# Patient Record
Sex: FEMALE | Race: WHITE | NOT HISPANIC OR LATINO | URBAN - METROPOLITAN AREA
[De-identification: names, ages, dates, MRNs, and addresses within clinical notes are randomized per-mention and may not be internally consistent; named-entity substitution may affect disease eponyms.]

---

## 2022-07-05 ENCOUNTER — INPATIENT (INPATIENT)
Facility: HOSPITAL | Age: 77
LOS: 0 days | Discharge: HOME | DRG: 305 | End: 2022-07-06
Attending: INTERNAL MEDICINE | Admitting: INTERNAL MEDICINE
Payer: COMMERCIAL

## 2022-07-05 VITALS
RESPIRATION RATE: 16 BRPM | SYSTOLIC BLOOD PRESSURE: 149 MMHG | TEMPERATURE: 97 F | HEART RATE: 84 BPM | DIASTOLIC BLOOD PRESSURE: 80 MMHG | OXYGEN SATURATION: 95 % | WEIGHT: 177.03 LBS

## 2022-07-05 DIAGNOSIS — R42 DIZZINESS AND GIDDINESS: ICD-10-CM

## 2022-07-05 DIAGNOSIS — R77.8 OTHER SPECIFIED ABNORMALITIES OF PLASMA PROTEINS: ICD-10-CM

## 2022-07-05 DIAGNOSIS — R55 SYNCOPE AND COLLAPSE: ICD-10-CM

## 2022-07-05 DIAGNOSIS — I10 ESSENTIAL (PRIMARY) HYPERTENSION: ICD-10-CM

## 2022-07-05 DIAGNOSIS — E11.9 TYPE 2 DIABETES MELLITUS WITHOUT COMPLICATIONS: ICD-10-CM

## 2022-07-05 LAB
ALBUMIN SERPL ELPH-MCNC: 3.4 G/DL — SIGNIFICANT CHANGE UP (ref 3.3–5)
ALP SERPL-CCNC: 94 U/L — SIGNIFICANT CHANGE UP (ref 40–120)
ALT FLD-CCNC: 16 U/L — SIGNIFICANT CHANGE UP (ref 12–78)
ANION GAP SERPL CALC-SCNC: 4 MMOL/L — LOW (ref 5–17)
APTT BLD: 32.5 SEC — SIGNIFICANT CHANGE UP (ref 27.5–35.5)
AST SERPL-CCNC: 21 U/L — SIGNIFICANT CHANGE UP (ref 15–37)
BASOPHILS # BLD AUTO: 0.06 K/UL — SIGNIFICANT CHANGE UP (ref 0–0.2)
BASOPHILS NFR BLD AUTO: 0.6 % — SIGNIFICANT CHANGE UP (ref 0–2)
BILIRUB SERPL-MCNC: 0.4 MG/DL — SIGNIFICANT CHANGE UP (ref 0.2–1.2)
BUN SERPL-MCNC: 14 MG/DL — SIGNIFICANT CHANGE UP (ref 7–23)
CALCIUM SERPL-MCNC: 9 MG/DL — SIGNIFICANT CHANGE UP (ref 8.5–10.1)
CHLORIDE SERPL-SCNC: 104 MMOL/L — SIGNIFICANT CHANGE UP (ref 96–108)
CK SERPL-CCNC: 79 U/L — SIGNIFICANT CHANGE UP (ref 26–192)
CO2 SERPL-SCNC: 28 MMOL/L — SIGNIFICANT CHANGE UP (ref 22–31)
CREAT SERPL-MCNC: 0.86 MG/DL — SIGNIFICANT CHANGE UP (ref 0.5–1.3)
EGFR: 70 ML/MIN/1.73M2 — SIGNIFICANT CHANGE UP
EOSINOPHIL # BLD AUTO: 0.1 K/UL — SIGNIFICANT CHANGE UP (ref 0–0.5)
EOSINOPHIL NFR BLD AUTO: 1 % — SIGNIFICANT CHANGE UP (ref 0–6)
GLUCOSE SERPL-MCNC: 116 MG/DL — HIGH (ref 70–99)
HCT VFR BLD CALC: 43.1 % — SIGNIFICANT CHANGE UP (ref 34.5–45)
HGB BLD-MCNC: 13.2 G/DL — SIGNIFICANT CHANGE UP (ref 11.5–15.5)
IMM GRANULOCYTES NFR BLD AUTO: 0.7 % — SIGNIFICANT CHANGE UP (ref 0–1.5)
INR BLD: 1.01 RATIO — SIGNIFICANT CHANGE UP (ref 0.88–1.16)
LIDOCAIN IGE QN: 128 U/L — SIGNIFICANT CHANGE UP (ref 73–393)
LYMPHOCYTES # BLD AUTO: 2.32 K/UL — SIGNIFICANT CHANGE UP (ref 1–3.3)
LYMPHOCYTES # BLD AUTO: 23.7 % — SIGNIFICANT CHANGE UP (ref 13–44)
MCHC RBC-ENTMCNC: 26.5 PG — LOW (ref 27–34)
MCHC RBC-ENTMCNC: 30.6 GM/DL — LOW (ref 32–36)
MCV RBC AUTO: 86.4 FL — SIGNIFICANT CHANGE UP (ref 80–100)
MONOCYTES # BLD AUTO: 0.72 K/UL — SIGNIFICANT CHANGE UP (ref 0–0.9)
MONOCYTES NFR BLD AUTO: 7.4 % — SIGNIFICANT CHANGE UP (ref 2–14)
NEUTROPHILS # BLD AUTO: 6.51 K/UL — SIGNIFICANT CHANGE UP (ref 1.8–7.4)
NEUTROPHILS NFR BLD AUTO: 66.6 % — SIGNIFICANT CHANGE UP (ref 43–77)
NRBC # BLD: 0 /100 WBCS — SIGNIFICANT CHANGE UP (ref 0–0)
PLATELET # BLD AUTO: 323 K/UL — SIGNIFICANT CHANGE UP (ref 150–400)
POTASSIUM SERPL-MCNC: 4.4 MMOL/L — SIGNIFICANT CHANGE UP (ref 3.5–5.3)
POTASSIUM SERPL-SCNC: 4.4 MMOL/L — SIGNIFICANT CHANGE UP (ref 3.5–5.3)
PROT SERPL-MCNC: 7.6 G/DL — SIGNIFICANT CHANGE UP (ref 6–8.3)
PROTHROM AB SERPL-ACNC: 11.8 SEC — SIGNIFICANT CHANGE UP (ref 10.5–13.4)
RBC # BLD: 4.99 M/UL — SIGNIFICANT CHANGE UP (ref 3.8–5.2)
RBC # FLD: 16 % — HIGH (ref 10.3–14.5)
SARS-COV-2 RNA SPEC QL NAA+PROBE: SIGNIFICANT CHANGE UP
SODIUM SERPL-SCNC: 136 MMOL/L — SIGNIFICANT CHANGE UP (ref 135–145)
TROPONIN I, HIGH SENSITIVITY RESULT: 316.7 NG/L — HIGH
TROPONIN I, HIGH SENSITIVITY RESULT: 935.7 NG/L — HIGH
WBC # BLD: 9.78 K/UL — SIGNIFICANT CHANGE UP (ref 3.8–10.5)
WBC # FLD AUTO: 9.78 K/UL — SIGNIFICANT CHANGE UP (ref 3.8–10.5)

## 2022-07-05 PROCEDURE — 70498 CT ANGIOGRAPHY NECK: CPT | Mod: 26,MA

## 2022-07-05 PROCEDURE — 99285 EMERGENCY DEPT VISIT HI MDM: CPT | Mod: FS

## 2022-07-05 PROCEDURE — 99223 1ST HOSP IP/OBS HIGH 75: CPT

## 2022-07-05 PROCEDURE — 71045 X-RAY EXAM CHEST 1 VIEW: CPT | Mod: 26

## 2022-07-05 PROCEDURE — 70496 CT ANGIOGRAPHY HEAD: CPT | Mod: 26,MA

## 2022-07-05 RX ORDER — DEXTROSE 50 % IN WATER 50 %
12.5 SYRINGE (ML) INTRAVENOUS ONCE
Refills: 0 | Status: DISCONTINUED | OUTPATIENT
Start: 2022-07-05 | End: 2022-07-06

## 2022-07-05 RX ORDER — MECLIZINE HCL 12.5 MG
25 TABLET ORAL EVERY 6 HOURS
Refills: 0 | Status: DISCONTINUED | OUTPATIENT
Start: 2022-07-05 | End: 2022-07-06

## 2022-07-05 RX ORDER — PANTOPRAZOLE SODIUM 20 MG/1
40 TABLET, DELAYED RELEASE ORAL
Refills: 0 | Status: DISCONTINUED | OUTPATIENT
Start: 2022-07-05 | End: 2022-07-06

## 2022-07-05 RX ORDER — INSULIN LISPRO 100/ML
VIAL (ML) SUBCUTANEOUS
Refills: 0 | Status: DISCONTINUED | OUTPATIENT
Start: 2022-07-05 | End: 2022-07-06

## 2022-07-05 RX ORDER — INSULIN LISPRO 100/ML
VIAL (ML) SUBCUTANEOUS AT BEDTIME
Refills: 0 | Status: DISCONTINUED | OUTPATIENT
Start: 2022-07-05 | End: 2022-07-06

## 2022-07-05 RX ORDER — DEXTROSE 50 % IN WATER 50 %
15 SYRINGE (ML) INTRAVENOUS ONCE
Refills: 0 | Status: DISCONTINUED | OUTPATIENT
Start: 2022-07-05 | End: 2022-07-06

## 2022-07-05 RX ORDER — FLUOXETINE HCL 10 MG
40 CAPSULE ORAL DAILY
Refills: 0 | Status: DISCONTINUED | OUTPATIENT
Start: 2022-07-05 | End: 2022-07-06

## 2022-07-05 RX ORDER — ALBUTEROL 90 UG/1
2 AEROSOL, METERED ORAL EVERY 6 HOURS
Refills: 0 | Status: DISCONTINUED | OUTPATIENT
Start: 2022-07-05 | End: 2022-07-06

## 2022-07-05 RX ORDER — ENOXAPARIN SODIUM 100 MG/ML
40 INJECTION SUBCUTANEOUS EVERY 24 HOURS
Refills: 0 | Status: DISCONTINUED | OUTPATIENT
Start: 2022-07-05 | End: 2022-07-06

## 2022-07-05 RX ORDER — MECLIZINE HCL 12.5 MG
25 TABLET ORAL ONCE
Refills: 0 | Status: COMPLETED | OUTPATIENT
Start: 2022-07-05 | End: 2022-07-05

## 2022-07-05 RX ORDER — ASPIRIN/CALCIUM CARB/MAGNESIUM 324 MG
81 TABLET ORAL DAILY
Refills: 0 | Status: DISCONTINUED | OUTPATIENT
Start: 2022-07-05 | End: 2022-07-06

## 2022-07-05 RX ORDER — FLUTICASONE PROPIONATE 50 MCG
1 SPRAY, SUSPENSION NASAL
Refills: 0 | Status: DISCONTINUED | OUTPATIENT
Start: 2022-07-05 | End: 2022-07-06

## 2022-07-05 RX ORDER — GLUCAGON INJECTION, SOLUTION 0.5 MG/.1ML
1 INJECTION, SOLUTION SUBCUTANEOUS ONCE
Refills: 0 | Status: DISCONTINUED | OUTPATIENT
Start: 2022-07-05 | End: 2022-07-06

## 2022-07-05 RX ORDER — ARIPIPRAZOLE 15 MG/1
2 TABLET ORAL DAILY
Refills: 0 | Status: DISCONTINUED | OUTPATIENT
Start: 2022-07-05 | End: 2022-07-06

## 2022-07-05 RX ORDER — GABAPENTIN 400 MG/1
300 CAPSULE ORAL THREE TIMES A DAY
Refills: 0 | Status: DISCONTINUED | OUTPATIENT
Start: 2022-07-05 | End: 2022-07-06

## 2022-07-05 RX ORDER — NICOTINE POLACRILEX 2 MG
1 GUM BUCCAL DAILY
Refills: 0 | Status: DISCONTINUED | OUTPATIENT
Start: 2022-07-05 | End: 2022-07-06

## 2022-07-05 RX ORDER — SODIUM CHLORIDE 9 MG/ML
1000 INJECTION, SOLUTION INTRAVENOUS
Refills: 0 | Status: DISCONTINUED | OUTPATIENT
Start: 2022-07-05 | End: 2022-07-06

## 2022-07-05 RX ORDER — ONDANSETRON 8 MG/1
4 TABLET, FILM COATED ORAL ONCE
Refills: 0 | Status: COMPLETED | OUTPATIENT
Start: 2022-07-05 | End: 2022-07-05

## 2022-07-05 RX ORDER — LOSARTAN POTASSIUM 100 MG/1
25 TABLET, FILM COATED ORAL DAILY
Refills: 0 | Status: DISCONTINUED | OUTPATIENT
Start: 2022-07-05 | End: 2022-07-06

## 2022-07-05 RX ORDER — ACETAMINOPHEN 500 MG
650 TABLET ORAL EVERY 6 HOURS
Refills: 0 | Status: DISCONTINUED | OUTPATIENT
Start: 2022-07-05 | End: 2022-07-06

## 2022-07-05 RX ORDER — LANOLIN ALCOHOL/MO/W.PET/CERES
3 CREAM (GRAM) TOPICAL AT BEDTIME
Refills: 0 | Status: DISCONTINUED | OUTPATIENT
Start: 2022-07-05 | End: 2022-07-06

## 2022-07-05 RX ORDER — DEXTROSE 50 % IN WATER 50 %
25 SYRINGE (ML) INTRAVENOUS ONCE
Refills: 0 | Status: DISCONTINUED | OUTPATIENT
Start: 2022-07-05 | End: 2022-07-06

## 2022-07-05 RX ORDER — FUROSEMIDE 40 MG
20 TABLET ORAL DAILY
Refills: 0 | Status: DISCONTINUED | OUTPATIENT
Start: 2022-07-05 | End: 2022-07-06

## 2022-07-05 RX ORDER — SODIUM CHLORIDE 9 MG/ML
500 INJECTION INTRAMUSCULAR; INTRAVENOUS; SUBCUTANEOUS ONCE
Refills: 0 | Status: COMPLETED | OUTPATIENT
Start: 2022-07-05 | End: 2022-07-05

## 2022-07-05 RX ORDER — ONDANSETRON 8 MG/1
4 TABLET, FILM COATED ORAL EVERY 6 HOURS
Refills: 0 | Status: DISCONTINUED | OUTPATIENT
Start: 2022-07-05 | End: 2022-07-06

## 2022-07-05 RX ADMIN — ENOXAPARIN SODIUM 40 MILLIGRAM(S): 100 INJECTION SUBCUTANEOUS at 22:42

## 2022-07-05 RX ADMIN — Medication 25 MILLIGRAM(S): at 11:29

## 2022-07-05 RX ADMIN — ONDANSETRON 4 MILLIGRAM(S): 8 TABLET, FILM COATED ORAL at 11:33

## 2022-07-05 RX ADMIN — Medication 1 PATCH: at 22:41

## 2022-07-05 RX ADMIN — GABAPENTIN 300 MILLIGRAM(S): 400 CAPSULE ORAL at 22:43

## 2022-07-05 RX ADMIN — SODIUM CHLORIDE 500 MILLILITER(S): 9 INJECTION INTRAMUSCULAR; INTRAVENOUS; SUBCUTANEOUS at 12:33

## 2022-07-05 RX ADMIN — SODIUM CHLORIDE 500 MILLILITER(S): 9 INJECTION INTRAMUSCULAR; INTRAVENOUS; SUBCUTANEOUS at 11:33

## 2022-07-05 NOTE — H&P ADULT - PROBLEM SELECTOR PLAN 1
Admit to tele  Check ECHO  Serial enzymes  Neuro check q4h  Neuro eval  PT  Further work-up/management pending clinical course.

## 2022-07-05 NOTE — H&P ADULT - HISTORY OF PRESENT ILLNESS
77 yo F PMHx HTN, DM, asthma presents to ED c/o dizziness/light-headedness, N/V x just prior to arrival. (LKW 9:30 as per pts roommate.) Pt states symptoms began while at PCP office. Routine follow up appointment, was discussing chronic constipation, PCP had pt lie down to examine her abdomen when she reports suddenly becoming dizzy, room spinning, vomiting and as per pt systolic BP at the time was 200. PCP called EMS. Pt states symptoms are persistent, present at rest but worse with head movement and positional changes. Denies HA, chest pain, SOB, abd pain (contrary to triage note), cough, fever/chills, recent travel, known sick contacts, urinary symptoms, numbness/tingling, weakness. Feels well now.

## 2022-07-05 NOTE — ED PROVIDER NOTE - CARE PLAN
1 Principal Discharge DX:	Dizziness  Secondary Diagnosis:	Elevated troponin  Secondary Diagnosis:	Near syncope

## 2022-07-05 NOTE — CONSULT NOTE ADULT - NS ATTEND AMEND GEN_ALL_CORE FT
Chart reviewed    Patient seen and examined    Agree with plan as outlined above    77 yo F PMH HTN, DM, PVD, back pain, presents to ED c/o dizziness/light-headedness, N/V found to have elevated Troponin. Cardiology called for dizziness and to r/o ACS    - Patient was at PCP office for routine follow up appointment, was discussing chronic constipation, PCP had pt lie down to examine her abdomen when she reports suddenly becoming dizzy, room spinning, vomiting and as per pt systolic BP at the time was 200.   - Patient was getting CT abdomen done, experienced recurrence in symptoms. Symptoms are persistent, present at rest but worse with head movement and positional changes.   - S/p Meclizine and Zofran x 1   - CK 99, CK-MB units 1.4, Troponin HsI 316.7. Follow up second set.  - Denies any chest pain, palpitation, orthopnea, PND, dizziness, lightheadedness.   - CK is normal, suggesting against acute atherosclerotic plaque rupture.  - EKG showed SR @ 81, poor R wave progression anteriorly. No old EKG to compare   - No clear evidence of acute ischemia.  - Elevated troponin likely related to hypertensive episode. She does have risk factors for CAD.   - BP stable and controlled in ER. Continue losartan   - Reports chronic SOB. + daily 1 pack smoker since 20 years.   - + LE edema chronic due to PVD.   - Continue home Lasix   - Given multiple risk factors for CAD, will need outpatient stress test.   - Monitor and replete lytes, keep K>4, Mg>2.

## 2022-07-05 NOTE — PATIENT PROFILE ADULT - FALL HARM RISK - HARM RISK INTERVENTIONS
Assistance with ambulation/Assistance OOB with selected safe patient handling equipment/Communicate Risk of Fall with Harm to all staff/Discuss with provider need for PT consult/Monitor gait and stability/Provide patient with walking aids - walker, cane, crutches/Reinforce activity limits and safety measures with patient and family/Sit up slowly, dangle for a short time, stand at bedside before walking/Tailored Fall Risk Interventions/Visual Cue: Yellow wristband and red socks/Bed in lowest position, wheels locked, appropriate side rails in place/Call bell, personal items and telephone in reach/Instruct patient to call for assistance before getting out of bed or chair/Non-slip footwear when patient is out of bed/[place] to call system/Physically safe environment - no spills, clutter or unnecessary equipment/Purposeful Proactive Rounding/Room/bathroom lighting operational, light cord in reach

## 2022-07-05 NOTE — H&P ADULT - PROBLEM SELECTOR PLAN 2
Possibly 2/2 elevated BP  Trend enzymes  ECHO  ASA 81mg qd  Check lipid profile  Cardio consult  Further work-up/management pending clinical course.

## 2022-07-05 NOTE — CONSULT NOTE ADULT - ASSESSMENT
75 yo F PMH HTN, DM, PVD, back pain, presents to ED c/o dizziness/light-headedness, N/V found to have elevated Troponin. Cardiology called for dizziness and to r/o ACS    - Patient was at PCP office for routine follow up appointment, was discussing chronic constipation, PCP had pt lie down to examine her abdomen when she reports suddenly becoming dizzy, room spinning, vomiting and as per pt systolic BP at the time was 200.   - Patient was getting CT abdomen done, experienced recurrence in symptoms. Symptoms are persistent, present at rest but worse with head movement and positional changes.   - S/p Meclizine and Zofran x 1   - CK 99, CK-MB units 1.4, Troponin HsI 316.7. Follow up second set.  - Denies any chest pain, palpitation, orthopnea, PND, dizziness, lightheadedness.   - CK is normal, suggesting against acute atherosclerotic plaque rupture.  - EKG showed SR @ 81, poor R wave progression anteriorly. No old EKG to compare   - No clear evidence of acute ischemia.  - Elevated troponin likely related to hypertensive episode. She does have risk factors for CAD.   - BP stable and controlled in ER. Continue losartan   - Reports chronic SOB. + daily 1 pack smoker since 20 years.   - + LE edema chronic due to PVD.   - Continue home Lasix   - Given multiple risk factors for CAD, will need outpatient stress test.   - Monitor and replete lytes, keep K>4, Mg>2.    Jackeline Clements, MS FNP, [facility]  Nurse Practitioner- Cardiology   Spectra #[phone] /[phone] 77 yo F PMH HTN, DM, PVD, back pain, presents to ED c/o dizziness/light-headedness, N/V found to have elevated Troponin. Cardiology called for dizziness and to r/o ACS    - Patient was at PCP office for routine follow up appointment, was discussing chronic constipation, PCP had pt lie down to examine her abdomen when she reports suddenly becoming dizzy, room spinning, vomiting and as per pt systolic BP at the time was 200.   - Patient was getting CT abdomen done, experienced recurrence in symptoms. Symptoms are persistent, present at rest but worse with head movement and positional changes.   - S/p Meclizine and Zofran x 1   - CK 99, CK-MB units 1.4, Troponin HsI 316.7. Follow up second set.  - Denies any chest pain, palpitation, orthopnea, PND, dizziness, lightheadedness.   - CK is normal, suggesting against acute atherosclerotic plaque rupture.  - EKG showed SR @ 81, poor R wave progression anteriorly. No old EKG to compare   - No clear evidence of acute ischemia.  - Elevated troponin likely related to hypertensive episode. She does have risk factors for CAD.   - Please obtain transthoracic echocardiogram to assess ventricular function, wall motion and valvular abnormalities.   - BP stable and controlled in ER. Continue losartan   - Reports chronic SOB. + daily 1 pack smoker since 20 years.   - + LE edema chronic due to PVD.   - Continue home Lasix   - Given multiple risk factors for CAD, will need outpatient stress test.   - Monitor and replete lytes, keep K>4, Mg>2.    Jackeline Clements, MS FNP, [facility]  Nurse Practitioner- Cardiology   Spectra #[phone] /[phone]

## 2022-07-05 NOTE — ED ADULT NURSE NOTE - NSIMPLEMENTINTERV_GEN_ALL_ED
Implemented All Fall Risk Interventions:  [place] to call system. Call bell, personal items and telephone within reach. Instruct patient to call for assistance. Room bathroom lighting operational. Non-slip footwear when patient is off stretcher. Physically safe environment: no spills, clutter or unnecessary equipment. Stretcher in lowest position, wheels locked, appropriate side rails in place. Provide visual cue, wrist band, yellow gown, etc. Monitor gait and stability. Monitor for mental status changes and reorient to person, place, and time. Review medications for side effects contributing to fall risk. Reinforce activity limits and safety measures with patient and family.

## 2022-07-05 NOTE — ED PROVIDER NOTE - OBJECTIVE STATEMENT
77 yo F PMHx HTN, DM presents to ED c/o dizziness/light-headedness, N/V x just prior to arrival. (LKW 9:30 as per pts roommate.) Pt states symptoms began while at PCP office. Routine follow up appointment, was discussing chronic constipation, PCP had pt lie down to examine her abdomen when she reports suddenly becoming dizzy, room spinning, vomiting and as per pt systolic BP at the time was 200. PCP called EMS. Pt states symptoms are persistent, present at rest but worse with head movement and positional changes. Denies HA, chest pain, SOB, abd pain (contrary to triage note), cough, fever/chills, recent travel, known sick contacts, urinary symptoms, numbness/tingling, weakness.

## 2022-07-05 NOTE — CONSULT NOTE ADULT - SUBJECTIVE AND OBJECTIVE BOX
[facility] - Libia Lara, Jose Batista, Meryl, Prasad, Jeremy Lott  Office Number: [phone]    Initial Consult Note  CHIEF COMPLAINT: Patient is a 76y old  Female who presents with a chief complaint of dizziness, nausea  HPI:  77 yo F PMH HTN, DM, PVD, back pain, presents to ED c/o dizziness/light-headedness, N/V x just prior to arrival. Pt states symptoms began while at PCP office for routine follow up appointment, was discussing chronic constipation, PCP had pt lie down to examine her abdomen when she reports suddenly becoming dizzy, room spinning, vomiting and as per pt systolic BP at the time was 200. PCP called EMS. Pt states symptoms are persistent, present at rest but worse with head movement and positional changes. Denies HA, abd pain, cough, fever/chills, recent travel, known sick contacts, urinary symptoms, numbness/tingling, weakness.      In ER, T(F): 98.2, HR: 89, BP: 124/56, RR: 18, SpO2: 95%. Labs remarkable for CK 99, CKMB units 1.4, Troponin HsI 316.7. Patient denies any chest pain, palpitation, orthopnea, PND, dizziness, lightheadedness. Reports chronic SOB. + daily 1 pack smoker since 20 years. + LE edema chronic due to PVD.     Allergies  No Known Allergies    PAST MEDICAL & SURGICAL HISTORY:  Hypertension    Diabetes mellitus    MEDICATIONS  (STANDING):    MEDICATIONS  (PRN):    FAMILY HISTORY:  Mother: Heart disease  Sister: Metastatic cancer  No family history of acute MI or sudden cardiac death.    SOCIAL HISTORY: No active ethanol, or drug abuse.  + daily 1 PPD smoker since age 19    REVIEW OF SYSTEMS   All other review of systems is negative unless indicated above.    VITAL SIGNS:   Vital Signs Last 24 Hrs  T(C): 36.8 (05 Jul 2022 13:56), Max: 36.8 (05 Jul 2022 13:56)  T(F): 98.2 (05 Jul 2022 13:56), Max: 98.2 (05 Jul 2022 13:56)  HR: 89 (05 Jul 2022 13:56) (84 - 89)  BP: 124/56 (05 Jul 2022 13:56) (124/56 - 149/80)  BP(mean): --  RR: 18 (05 Jul 2022 13:56) (16 - 18)  SpO2: 95% (05 Jul 2022 13:56) (95% - 95%)    Physical Exam:  Constitutional: NAD, awake and alert, Obese   HEENT: Moist Mucous Membranes, Anicteric  Pulmonary: Non-labored, breath sounds are clear bilaterally, Diminished at bases, No wheezing, rales or rhonchi  Cardiovascular: Regular, S1 and S2, No murmurs, No rubs, gallops or clicks  Gastrointestinal: Bowel Sounds present, soft, nontender.   Lymph: +1 peripheral edema. No lymphadenopathy.  Skin: No visible rashes or ulcers.  Psych:  Mood & affect appropriate    I&O's Summary      LABS: All Labs Reviewed:                        13.2   9.78  )-----------( 323      ( 05 Jul 2022 11:18 )             43.1     05 Jul 2022 12:25    136    |  104    |  14     ----------------------------<  116    4.4     |  28     |  0.86     Ca    9.0        05 Jul 2022 12:25    TPro  7.6    /  Alb  3.4    /  TBili  0.4    /  DBili  x      /  AST  21     /  ALT  16     /  AlkPhos  94     05 Jul 2022 12:25    PT/INR - ( 05 Jul 2022 12:25 )   PT: 11.8 sec;   INR: 1.01 ratio         PTT - ( 05 Jul 2022 12:25 )  PTT:32.5 secTroponin I, High Sensitivity Result: 316.7 ng/L (07-05-22 @ 12:25)  Creatine Kinase, Serum: 99 U/L (07-05-22 @ 12:25)    Blood Culture:   12 Lead ECG:   Ventricular Rate 81 BPM    Atrial Rate 81 BPM    P-R Interval 202 ms    QRS Duration 86 ms    Q-T Interval 386 ms    QTC Calculation(Bazett) 448 ms    P Axis 57 degrees    R Axis -20 degrees    T Axis 71 degrees    Diagnosis Line Normal sinus rhythm  Low voltage QRS  Borderline ECG  No previous ECGs available  Confirmed by Neto Batista MD ([id number]) on 7/5/2022 1:51:58 PM (07-05-22 @ 11:25)

## 2022-07-05 NOTE — ED ADULT NURSE NOTE - OBJECTIVE STATEMENT
Pt c/o abd pain, dizziness, n/v, light headed, HTN started today, pt was at her PCP for routine check up when PCP noticed high blood pressure. Pt c/o abd pain, dizziness, n/v, light headed, HTN started today, pt was at her PCP for routine check up when PCP noticed high blood pressure. Denies CP, SOB, numbness, weakness. Safety maintained, call bell within reach. Nursing care ongoing.

## 2022-07-05 NOTE — ED PROVIDER NOTE - ATTENDING APP SHARED VISIT CONTRIBUTION OF CARE
77 yo f bib ems for eval from pmd office. she was there for routine care and during eval was laid flat for exam becoming very dizzy and nauseated vomiting. the md took bp found it high so ems was called  pt feels a bit better but still nauseated on my exam able to ambulate from toilet to wheelchair and needing assistance to get out of chair to bed  she is a daily smoker denies eoth  on eval  chronically ill female awake alert no distress holding emesis basin  heent nc at mmm perrla no active nystagmus on head turn tm's clear op clear lynn midline and dry appearing.   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

## 2022-07-06 ENCOUNTER — TRANSCRIPTION ENCOUNTER (OUTPATIENT)
Age: 77
End: 2022-07-06

## 2022-07-06 VITALS
DIASTOLIC BLOOD PRESSURE: 79 MMHG | HEART RATE: 80 BPM | RESPIRATION RATE: 18 BRPM | SYSTOLIC BLOOD PRESSURE: 134 MMHG | TEMPERATURE: 98 F | OXYGEN SATURATION: 90 %

## 2022-07-06 LAB
A1C WITH ESTIMATED AVERAGE GLUCOSE RESULT: 6.1 % — HIGH (ref 4–5.6)
ANION GAP SERPL CALC-SCNC: 5 MMOL/L — SIGNIFICANT CHANGE UP (ref 5–17)
BUN SERPL-MCNC: 9 MG/DL — SIGNIFICANT CHANGE UP (ref 7–23)
CALCIUM SERPL-MCNC: 8.6 MG/DL — SIGNIFICANT CHANGE UP (ref 8.5–10.1)
CHLORIDE SERPL-SCNC: 102 MMOL/L — SIGNIFICANT CHANGE UP (ref 96–108)
CHOLEST SERPL-MCNC: 140 MG/DL — SIGNIFICANT CHANGE UP
CK SERPL-CCNC: 87 U/L — SIGNIFICANT CHANGE UP (ref 26–192)
CK SERPL-CCNC: 98 U/L — SIGNIFICANT CHANGE UP (ref 26–192)
CO2 SERPL-SCNC: 30 MMOL/L — SIGNIFICANT CHANGE UP (ref 22–31)
CREAT SERPL-MCNC: 0.85 MG/DL — SIGNIFICANT CHANGE UP (ref 0.5–1.3)
EGFR: 71 ML/MIN/1.73M2 — SIGNIFICANT CHANGE UP
ESTIMATED AVERAGE GLUCOSE: 128 MG/DL — HIGH (ref 68–114)
GLUCOSE SERPL-MCNC: 96 MG/DL — SIGNIFICANT CHANGE UP (ref 70–99)
HCT VFR BLD CALC: 43.6 % — SIGNIFICANT CHANGE UP (ref 34.5–45)
HDLC SERPL-MCNC: 38 MG/DL — LOW
HGB BLD-MCNC: 13.5 G/DL — SIGNIFICANT CHANGE UP (ref 11.5–15.5)
LIPID PNL WITH DIRECT LDL SERPL: 82 MG/DL — SIGNIFICANT CHANGE UP
MAGNESIUM SERPL-MCNC: 1.9 MG/DL — SIGNIFICANT CHANGE UP (ref 1.6–2.6)
MCHC RBC-ENTMCNC: 26.9 PG — LOW (ref 27–34)
MCHC RBC-ENTMCNC: 31 GM/DL — LOW (ref 32–36)
MCV RBC AUTO: 86.9 FL — SIGNIFICANT CHANGE UP (ref 80–100)
NON HDL CHOLESTEROL: 102 MG/DL — SIGNIFICANT CHANGE UP
NRBC # BLD: 0 /100 WBCS — SIGNIFICANT CHANGE UP (ref 0–0)
PLATELET # BLD AUTO: 385 K/UL — SIGNIFICANT CHANGE UP (ref 150–400)
POTASSIUM SERPL-MCNC: 4.3 MMOL/L — SIGNIFICANT CHANGE UP (ref 3.5–5.3)
POTASSIUM SERPL-SCNC: 4.3 MMOL/L — SIGNIFICANT CHANGE UP (ref 3.5–5.3)
RBC # BLD: 5.02 M/UL — SIGNIFICANT CHANGE UP (ref 3.8–5.2)
RBC # FLD: 16.1 % — HIGH (ref 10.3–14.5)
SODIUM SERPL-SCNC: 137 MMOL/L — SIGNIFICANT CHANGE UP (ref 135–145)
TRIGL SERPL-MCNC: 104 MG/DL — SIGNIFICANT CHANGE UP
TROPONIN I, HIGH SENSITIVITY RESULT: 608.3 NG/L — HIGH
WBC # BLD: 11.39 K/UL — HIGH (ref 3.8–10.5)
WBC # FLD AUTO: 11.39 K/UL — HIGH (ref 3.8–10.5)

## 2022-07-06 PROCEDURE — 82962 GLUCOSE BLOOD TEST: CPT

## 2022-07-06 PROCEDURE — 93306 TTE W/DOPPLER COMPLETE: CPT

## 2022-07-06 PROCEDURE — 93306 TTE W/DOPPLER COMPLETE: CPT | Mod: 26

## 2022-07-06 PROCEDURE — 85027 COMPLETE CBC AUTOMATED: CPT

## 2022-07-06 PROCEDURE — G1004: CPT

## 2022-07-06 PROCEDURE — 70450 CT HEAD/BRAIN W/O DYE: CPT | Mod: MG

## 2022-07-06 PROCEDURE — 97161 PT EVAL LOW COMPLEX 20 MIN: CPT

## 2022-07-06 PROCEDURE — 85730 THROMBOPLASTIN TIME PARTIAL: CPT

## 2022-07-06 PROCEDURE — 93005 ELECTROCARDIOGRAM TRACING: CPT

## 2022-07-06 PROCEDURE — 87635 SARS-COV-2 COVID-19 AMP PRB: CPT

## 2022-07-06 PROCEDURE — 85025 COMPLETE CBC W/AUTO DIFF WBC: CPT

## 2022-07-06 PROCEDURE — 71045 X-RAY EXAM CHEST 1 VIEW: CPT

## 2022-07-06 PROCEDURE — 80053 COMPREHEN METABOLIC PANEL: CPT

## 2022-07-06 PROCEDURE — 80048 BASIC METABOLIC PNL TOTAL CA: CPT

## 2022-07-06 PROCEDURE — 83735 ASSAY OF MAGNESIUM: CPT

## 2022-07-06 PROCEDURE — 85610 PROTHROMBIN TIME: CPT

## 2022-07-06 PROCEDURE — 70498 CT ANGIOGRAPHY NECK: CPT | Mod: MA

## 2022-07-06 PROCEDURE — 83690 ASSAY OF LIPASE: CPT

## 2022-07-06 PROCEDURE — 84484 ASSAY OF TROPONIN QUANT: CPT

## 2022-07-06 PROCEDURE — 70496 CT ANGIOGRAPHY HEAD: CPT | Mod: MA

## 2022-07-06 PROCEDURE — 82550 ASSAY OF CK (CPK): CPT

## 2022-07-06 PROCEDURE — 96374 THER/PROPH/DIAG INJ IV PUSH: CPT

## 2022-07-06 PROCEDURE — 82553 CREATINE MB FRACTION: CPT

## 2022-07-06 PROCEDURE — 36415 COLL VENOUS BLD VENIPUNCTURE: CPT

## 2022-07-06 PROCEDURE — 80061 LIPID PANEL: CPT

## 2022-07-06 PROCEDURE — 99232 SBSQ HOSP IP/OBS MODERATE 35: CPT

## 2022-07-06 PROCEDURE — 96361 HYDRATE IV INFUSION ADD-ON: CPT

## 2022-07-06 PROCEDURE — 83036 HEMOGLOBIN GLYCOSYLATED A1C: CPT

## 2022-07-06 PROCEDURE — 99285 EMERGENCY DEPT VISIT HI MDM: CPT

## 2022-07-06 PROCEDURE — 94640 AIRWAY INHALATION TREATMENT: CPT

## 2022-07-06 RX ORDER — ASPIRIN/CALCIUM CARB/MAGNESIUM 324 MG
1 TABLET ORAL
Qty: 0 | Refills: 0 | DISCHARGE
Start: 2022-07-06

## 2022-07-06 RX ADMIN — GABAPENTIN 300 MILLIGRAM(S): 400 CAPSULE ORAL at 15:12

## 2022-07-06 RX ADMIN — Medication 1 SPRAY(S): at 11:17

## 2022-07-06 RX ADMIN — ARIPIPRAZOLE 2 MILLIGRAM(S): 15 TABLET ORAL at 11:17

## 2022-07-06 RX ADMIN — Medication 1 PATCH: at 07:30

## 2022-07-06 RX ADMIN — Medication 81 MILLIGRAM(S): at 11:17

## 2022-07-06 RX ADMIN — Medication 40 MILLIGRAM(S): at 11:17

## 2022-07-06 RX ADMIN — Medication 20 MILLIGRAM(S): at 05:44

## 2022-07-06 RX ADMIN — PANTOPRAZOLE SODIUM 40 MILLIGRAM(S): 20 TABLET, DELAYED RELEASE ORAL at 05:44

## 2022-07-06 RX ADMIN — GABAPENTIN 300 MILLIGRAM(S): 400 CAPSULE ORAL at 05:44

## 2022-07-06 RX ADMIN — LOSARTAN POTASSIUM 25 MILLIGRAM(S): 100 TABLET, FILM COATED ORAL at 05:44

## 2022-07-06 NOTE — DISCHARGE NOTE NURSING/CASE MANAGEMENT/SOCIAL WORK - PATIENT PORTAL LINK FT
You can access the FollowMyHealth Patient Portal offered by [facility] by registering at the following website: [email or web address]. By joining [facility]’s FollowMyHealth portal, you will also be able to view your health information using other applications (apps) compatible with our system.

## 2022-07-06 NOTE — DISCHARGE NOTE NURSING/CASE MANAGEMENT/SOCIAL WORK - NSDCPEFALRISK_GEN_ALL_CORE
For information on Fall & Injury Prevention, visit: [email or web address] OR  [email or web address] OR  https://www.cdc.gov/steadi/patient.html

## 2022-07-06 NOTE — PHYSICAL THERAPY INITIAL EVALUATION ADULT - PLANNED THERAPY INTERVENTIONS, PT EVAL
2-3 Sessions: Stair Negotiation - Ascend/descend 4 steps with bilateral handrails independently./bed mobility training/gait training/transfer training

## 2022-07-06 NOTE — DISCHARGE NOTE PROVIDER - PROVIDER TOKENS
PROVIDER:[TOKEN:[[id number]],FOLLOWUP:[1 week],ESTABLISHEDPATIENT:[T]],PROVIDER:[TOKEN:[[id number]],FOLLOWUP:[1 week]] PROVIDER:[TOKEN:[[id number]],FOLLOWUP:[1 week],ESTABLISHEDPATIENT:[T]],PROVIDER:[TOKEN:[[id number]],FOLLOWUP:[1 week]],PROVIDER:[TOKEN:[[id number]],FOLLOWUP:[1 week]]

## 2022-07-06 NOTE — DISCHARGE NOTE NURSING/CASE MANAGEMENT/SOCIAL WORK - NSDCPEEMAIL_GEN_ALL_CORE
[facility] for Tobacco Control email [email or web address]

## 2022-07-06 NOTE — PROGRESS NOTE ADULT - PROBLEM SELECTOR PLAN 2
Possibly 2/2 elevated BP  Enzymes trending down  ECHO  ASA 81mg qd  Check lipid profile  Cardio f/u -- will need outpatient stress test  Further work-up/management pending clinical course.

## 2022-07-06 NOTE — DISCHARGE NOTE PROVIDER - NSDCCPCAREPLAN_GEN_ALL_CORE_FT
PRINCIPAL DISCHARGE DIAGNOSIS  Diagnosis: Elevated troponin  Assessment and Plan of Treatment: Follow-up with your primary care doctor and cardiologist within 1 week.   You'll need an outpatient stress test       PRINCIPAL DISCHARGE DIAGNOSIS  Diagnosis: Elevated troponin  Assessment and Plan of Treatment: Follow-up with your primary care doctor and cardiologist within 1 week.   You'll need an outpatient stress test      SECONDARY DISCHARGE DIAGNOSES  Diagnosis: Dizziness  Assessment and Plan of Treatment: Follow-up with your primary care doctor and neurology for outpatient stand-up MRI within 1 week.

## 2022-07-06 NOTE — DISCHARGE NOTE PROVIDER - CARE PROVIDER_API CALL
JUSTINA MARTINEZ  Internal Medicine  [address] [place] [address]  Phone: [phone]  Fax: [phone]  Established Patient  Follow Up Time: 1 week    Neto Batista)  Cardiovascular Disease  [address] [place] [address]  Phone: [phone]  Fax: [phone]  Follow Up Time: 1 week   JUSTINA MARTINEZ  Internal Medicine  [address] [place] [address]  Phone: [phone]  Fax: [phone]  Established Patient  Follow Up Time: 1 week    Neto Batista)  Cardiovascular Disease  [address] [place] [address]  Phone: [phone]  Fax: [phone]  Follow Up Time: 1 week    Cici Bartlett  NEUROLOGY  [address] [place] [address]  Phone: [phone]  Fax: [phone]  Follow Up Time: 1 week

## 2022-07-06 NOTE — DISCHARGE NOTE PROVIDER - NSDCMRMEDTOKEN_GEN_ALL_CORE_FT
ARIPiprazole 2 mg oral tablet: 1 tab(s) orally once a day  aspirin 81 mg oral delayed release tablet: 1 tab(s) orally once a day  baclofen 10 mg oral tablet: 1 tab(s) orally once a day (at bedtime), As Needed  clotrimazole 1% topical cream: Apply topically to affected area 2 times a day  Flonase 50 mcg/inh nasal spray: 1 spray(s) in each nostril once a day  FLUoxetine 40 mg oral capsule: 1 cap(s) orally once a day  furosemide 20 mg oral tablet: 1 tab(s) orally once a day  gabapentin 300 mg oral capsule: 1 cap(s) orally 3 times a day  losartan 25 mg oral tablet: 1 tab(s) orally once a day  metFORMIN 500 mg oral tablet: 1 tab(s) orally 2 times a day  Naprosyn 500 mg oral tablet: 1 tab(s) orally every 12 hours, As Needed  Oyster Shell Calcium 500 (1250 mg calcium carbonate) oral tablet: 1 tab(s) orally 2 times a day  pantoprazole 40 mg oral delayed release tablet: 1 tab(s) orally once a day  ProAir HFA 90 mcg/inh inhalation aerosol: 2 puff(s) inhaled every 4 hours, As Needed  Vitamin D3 25 mcg (1000 intl units) oral tablet: 1 tab(s) orally once a day

## 2022-07-06 NOTE — DISCHARGE NOTE NURSING/CASE MANAGEMENT/SOCIAL WORK - NSDCPEWEB_GEN_ALL_CORE
[facility] for Tobacco Control website --- [email or web address] website --- [email or web address]

## 2022-07-06 NOTE — PROGRESS NOTE ADULT - SUBJECTIVE AND OBJECTIVE BOX
[facility] -- Libia Lara Grossman, [place], Prasad Sheth Savella, Goodger: Office # [phone]    Follow Up:  elevated trop, HTN     Subjective/Observations: Patient seen and examined, awake, alert, resting comfortably in bed, denies chest pain, palpitations or dizziness, orthopnea and PND. Tolerating O2 via NC, c/o weakness, int SOB    REVIEW OF SYSTEMS: All review of systems is negative for eye, ENT, GI, , allergic, dermatologic, musculoskeletal and neurologic except as described above    PAST MEDICAL & SURGICAL HISTORY:  Hypertension      Diabetes mellitus          MEDICATIONS  (STANDING):  ARIPiprazole 2 milliGRAM(s) Oral daily  aspirin enteric coated 81 milliGRAM(s) Oral daily  dextrose 5%. 1000 milliLiter(s) (50 mL/Hr) IV Continuous <Continuous>  dextrose 5%. 1000 milliLiter(s) (100 mL/Hr) IV Continuous <Continuous>  dextrose 50% Injectable 25 Gram(s) IV Push once  dextrose 50% Injectable 12.5 Gram(s) IV Push once  dextrose 50% Injectable 25 Gram(s) IV Push once  enoxaparin Injectable 40 milliGRAM(s) SubCutaneous every 24 hours  FLUoxetine 40 milliGRAM(s) Oral daily  fluticasone propionate 50 MICROgram(s)/spray Nasal Spray 1 Spray(s) Both Nostrils <User Schedule>  furosemide    Tablet 20 milliGRAM(s) Oral daily  gabapentin 300 milliGRAM(s) Oral three times a day  glucagon  Injectable 1 milliGRAM(s) IntraMuscular once  insulin lispro (ADMELOG) corrective regimen sliding scale   SubCutaneous three times a day before meals  insulin lispro (ADMELOG) corrective regimen sliding scale   SubCutaneous at bedtime  losartan 25 milliGRAM(s) Oral daily  nicotine -  14 mG/24Hr(s) Patch 1 Patch Transdermal daily  pantoprazole    Tablet 40 milliGRAM(s) Oral before breakfast    MEDICATIONS  (PRN):  acetaminophen     Tablet .. 650 milliGRAM(s) Oral every 6 hours PRN Temp greater or equal to 38C (100.4F), Mild Pain (1 - 3)  ALBUTerol    90 MICROgram(s) HFA Inhaler 2 Puff(s) Inhalation every 6 hours PRN Shortness of Breath and/or Wheezing  aluminum hydroxide/magnesium hydroxide/simethicone Suspension 30 milliLiter(s) Oral every 4 hours PRN Dyspepsia  dextrose Oral Gel 15 Gram(s) Oral once PRN Blood Glucose LESS THAN 70 milliGRAM(s)/deciliter  meclizine 25 milliGRAM(s) Oral every 6 hours PRN Dizziness  melatonin 3 milliGRAM(s) Oral at bedtime PRN Insomnia  ondansetron Injectable 4 milliGRAM(s) IV Push every 6 hours PRN Nausea and/or Vomiting    Allergies    No Known Allergies    Intolerances      Vital Signs Last 24 Hrs  T(C): 36.6 (06 Jul 2022 04:54), Max: 36.9 (05 Jul 2022 16:30)  T(F): 97.9 (06 Jul 2022 04:54), Max: 98.4 (05 Jul 2022 16:30)  HR: 84 (06 Jul 2022 04:54) (83 - 92)  BP: 122/76 (06 Jul 2022 04:54) (105/64 - 128/76)  BP(mean): --  RR: 19 (06 Jul 2022 04:54) (18 - 20)  SpO2: 95% (06 Jul 2022 04:54) (93% - 95%)  I&O's Summary    Weight (kg): 79.9 (07-05 @ 22:48)    TELE:  70-90's   PHYSICAL EXAM:  Constitutional: NAD, awake and alert, obese  HEENT: Moist Mucous Membranes, Anicteric  Pulmonary: Non-labored, breath sounds are clear, diminished at bases bilaterally, No wheezing, rales or rhonchi  Cardiovascular: Regular, S1 and S2, No murmurs, rubs, gallops or clicks  Gastrointestinal: Bowel Sounds present, soft, nontender.   Lymph: +1 peripheral edema. No lymphadenopathy.  Skin: No visible rashes or ulcers.  Psych:  Mood & affect appropriate for situation    LABS: All Labs Reviewed:                        13.5   11.39 )-----------( 385      ( 06 Jul 2022 04:50 )             43.6                         13.2   9.78  )-----------( 323      ( 05 Jul 2022 11:18 )             43.1     06 Jul 2022 04:50    137    |  102    |  9      ----------------------------<  96     4.3     |  30     |  0.85   05 Jul 2022 12:25    136    |  104    |  14     ----------------------------<  116    4.4     |  28     |  0.86     Ca    8.6        06 Jul 2022 04:50  Ca    9.0        05 Jul 2022 12:25  Mg     1.9       06 Jul 2022 04:50    TPro  7.6    /  Alb  3.4    /  TBili  0.4    /  DBili  x      /  AST  21     /  ALT  16     /  AlkPhos  94     05 Jul 2022 12:25    PT/INR - ( 05 Jul 2022 12:25 )   PT: 11.8 sec;   INR: 1.01 ratio         PTT - ( 05 Jul 2022 12:25 )  PTT:32.5 sec  Troponin I, High Sensitivity Result: 608.3 ng/L (07-06-22 @ 04:50)  Creatine Kinase, Serum: 87 U/L (07-06-22 @ 04:50)  Creatine Kinase, Serum: 79 U/L (07-05-22 @ 21:00)  Troponin I, High Sensitivity Result: 935.7 ng/L (07-05-22 @ 21:00)  Troponin I, High Sensitivity Result: 316.7 ng/L (07-05-22 @ 12:25)            Cholesterol, Serum: 140 mg/dL (07-06-22 @ 04:50)  HDL Cholesterol, Serum: 38 mg/dL (07-06-22 @ 04:50)  Triglycerides, Serum: 104 mg/dL (07-06-22 @ 04:50)      12 Lead ECG:   Ventricular Rate 81 BPM    Atrial Rate 81 BPM    P-R Interval 202 ms    QRS Duration 86 ms    Q-T Interval 386 ms    QTC Calculation(Bazett) 448 ms    P Axis 57 degrees    R Axis -20 degrees    T Axis 71 degrees    Diagnosis Line Normal sinus rhythm  Low voltage QRS  Borderline ECG  No previous ECGs available  Confirmed by Lester OKEEFE, Neto ([id number]) on 7/5/2022 1:51:58 PM (07-05-22 @ 11:25)    
neuro cons dict  seen for dizziness likely vertigo  cva less likely   ct head- no acute cva  cta of the head and neck- no large vessels occlusion  brain mri without  
Patient is a 76y old  Female who presents with a chief complaint of dizziness, nausea (05 Jul 2022 14:43)      INTERVAL HPI/OVERNIGHT EVENTS: Patient seen and examined. NAD. No complaints. Feels better, back to baseline    Vital Signs Last 24 Hrs  T(C): 36.6 (06 Jul 2022 04:54), Max: 36.9 (05 Jul 2022 16:30)  T(F): 97.9 (06 Jul 2022 04:54), Max: 98.4 (05 Jul 2022 16:30)  HR: 84 (06 Jul 2022 04:54) (83 - 92)  BP: 122/76 (06 Jul 2022 04:54) (105/64 - 128/76)  BP(mean): --  RR: 19 (06 Jul 2022 04:54) (18 - 20)  SpO2: 95% (06 Jul 2022 04:54) (93% - 95%)    07-06    137  |  102  |  9   ----------------------------<  96  4.3   |  30  |  0.85    Ca    8.6      06 Jul 2022 04:50  Mg     1.9     07-06    TPro  7.6  /  Alb  3.4  /  TBili  0.4  /  DBili  x   /  AST  21  /  ALT  16  /  AlkPhos  94  07-05                          13.5   11.39 )-----------( 385      ( 06 Jul 2022 04:50 )             43.6     PT/INR - ( 05 Jul 2022 12:25 )   PT: 11.8 sec;   INR: 1.01 ratio         PTT - ( 05 Jul 2022 12:25 )  PTT:32.5 sec  CAPILLARY BLOOD GLUCOSE      POCT Blood Glucose.: 115 mg/dL (06 Jul 2022 08:04)  POCT Blood Glucose.: 113 mg/dL (05 Jul 2022 22:28)  POCT Blood Glucose.: 115 mg/dL (05 Jul 2022 17:45)              acetaminophen     Tablet .. 650 milliGRAM(s) Oral every 6 hours PRN  ALBUTerol    90 MICROgram(s) HFA Inhaler 2 Puff(s) Inhalation every 6 hours PRN  aluminum hydroxide/magnesium hydroxide/simethicone Suspension 30 milliLiter(s) Oral every 4 hours PRN  ARIPiprazole 2 milliGRAM(s) Oral daily  aspirin enteric coated 81 milliGRAM(s) Oral daily  dextrose 5%. 1000 milliLiter(s) IV Continuous <Continuous>  dextrose 5%. 1000 milliLiter(s) IV Continuous <Continuous>  dextrose 50% Injectable 25 Gram(s) IV Push once  dextrose 50% Injectable 12.5 Gram(s) IV Push once  dextrose 50% Injectable 25 Gram(s) IV Push once  dextrose Oral Gel 15 Gram(s) Oral once PRN  enoxaparin Injectable 40 milliGRAM(s) SubCutaneous every 24 hours  FLUoxetine 40 milliGRAM(s) Oral daily  fluticasone propionate 50 MICROgram(s)/spray Nasal Spray 1 Spray(s) Both Nostrils <User Schedule>  furosemide    Tablet 20 milliGRAM(s) Oral daily  gabapentin 300 milliGRAM(s) Oral three times a day  glucagon  Injectable 1 milliGRAM(s) IntraMuscular once  insulin lispro (ADMELOG) corrective regimen sliding scale   SubCutaneous three times a day before meals  insulin lispro (ADMELOG) corrective regimen sliding scale   SubCutaneous at bedtime  losartan 25 milliGRAM(s) Oral daily  meclizine 25 milliGRAM(s) Oral every 6 hours PRN  melatonin 3 milliGRAM(s) Oral at bedtime PRN  nicotine -  14 mG/24Hr(s) Patch 1 Patch Transdermal daily  ondansetron Injectable 4 milliGRAM(s) IV Push every 6 hours PRN  pantoprazole    Tablet 40 milliGRAM(s) Oral before breakfast              REVIEW OF SYSTEMS:  CONSTITUTIONAL: No fever, no weight loss, or no fatigue  NECK: No pain, no stiffness  RESPIRATORY: No cough, no wheezing, no chills, no hemoptysis, No shortness of breath  CARDIOVASCULAR: No chest pain, no palpitations, no dizziness, no leg swelling  GASTROINTESTINAL: No abdominal pain. No nausea, no vomiting, no hematemesis; No diarrhea, no constipation. No melena, no hematochezia.  GENITOURINARY: No dysuria, no frequency, no hematuria, no incontinence  NEUROLOGICAL: No headaches, no loss of strength, no numbness, no tremors  SKIN: No itching, no burning  MUSCULOSKELETAL: No joint pain, no swelling; No muscle, no back, no extremity pain  PSYCHIATRIC: No depression, no mood swings,   HEME/LYMPH: No easy bruising, no bleeding gums  ALLERY AND IMMUNOLOGIC: No hives       Consultant(s) Notes Reviewed:  [X] YES  [ ] NO    PHYSICAL EXAM:  GENERAL: NAD  HEAD:  Atraumatic, Normocephalic  EYES: EOMI, PERRLA, conjunctiva and sclera clear  ENMT: No tonsillar erythema, exudates, or enlargement; Moist mucous membranes  NECK: Supple, No JVD  NERVOUS SYSTEM:  Awake & alert  CHEST/LUNG: Clear to auscultation bilaterally; No rales, rhonchi, wheezing,  HEART: Regular rate and rhythm  ABDOMEN: Soft, Nontender, Nondistended; Bowel sounds present  EXTREMITIES:  No clubbing, cyanosis, or edema  LYMPH: No lymphadenopathy noted  SKIN: No rashes      Advanced care planning discussed with patient/family [X] YES   [ ] NO    Advanced care planning discussed with patient/family. Patient's health status was discussed. All appropriate changes have been made regarding patient's end-of-life care. Advanced care planning forms reviewed/discussed/completed.  20 minutes spent.

## 2022-07-06 NOTE — PROGRESS NOTE ADULT - ASSESSMENT
75 yo F PMH HTN, DM, PVD, back pain, presents to ED c/o dizziness/light-headedness, N/V found to have elevated Troponin. Cardiology called for dizziness and to r/o ACS    - Patient was at PCP office for routine follow up appointment, was discussing chronic constipation, PCP had pt lie down to examine her abdomen when she reports suddenly becoming dizzy, room spinning, vomiting and as per pt systolic BP at the time was 200.   - Patient was getting CT abdomen done, experienced recurrence in symptoms. Symptoms are persistent, present at rest but worse with head movement and positional changes.   - S/p Meclizine and Zofran x 1   - with trop leak, had peaked and now downtrending, no need to trend further   - Denies any chest pain, palpitation, orthopnea, PND, dizziness, lightheadedness.   - CK is normal, suggesting against acute atherosclerotic plaque rupture.  - Elevated troponin likely related to hypertensive episode. She does have risk factors for CAD.   - EKG showed SR @ 81, poor R wave progression anteriorly. No old EKG to compare   - No clear evidence of acute ischemia.      - Reports chronic SOB. + daily 1 pack smoker since 20 years, also has hs of deviated septum   - + LE edema chronic due to PVD.   - Continue home Lasix   - f/u transthoracic echocardiogram to assess ventricular function, wall motion and valvular abnormalities.     - BP improved and stable   - Continue home dose  losartan     - Given multiple risk factors for CAD, will need outpatient stress test.   - Monitor and replete lytes, keep K>4, Mg>2.  - Will continue to follow.    Renetta Ventura, [facility]  Nurse Practitioner - Cardiology   Spectra #[phone]

## 2022-07-06 NOTE — PHYSICAL THERAPY INITIAL EVALUATION ADULT - GENERAL OBSERVATIONS, REHAB EVAL
Patient was received and left sitting up at the side of the bed with NC & tele and all lines in place, call bell in reach and left with roommate (from home) present.

## 2022-07-06 NOTE — DISCHARGE NOTE PROVIDER - HOSPITAL COURSE
75 yo F PMHx HTN, DM, asthma presents to ED c/o dizziness/light-headedness, N/V x just prior to arrival. (LKW 9:30 as per pts roommate.) Pt states symptoms began while at PCP office. Routine follow up appointment, was discussing chronic constipation, PCP had pt lie down to examine her abdomen when she reports suddenly becoming dizzy, room spinning, vomiting and as per pt systolic BP at the time was 200. PCP called EMS. Pt states symptoms are persistent, present at rest but worse with head movement and positional changes. Denies HA, chest pain, SOB, abd pain (contrary to triage note), cough, fever/chills, recent travel, known sick contacts, urinary symptoms, numbness/tingling, weakness. Feels well now.    + troponin likely 2/2 HTN urgency -- trended down   77 yo F PMHx HTN, DM, asthma presents to ED c/o dizziness/light-headedness, N/V x just prior to arrival. (LKW 9:30 as per pts roommate.) Pt states symptoms began while at PCP office. Routine follow up appointment, was discussing chronic constipation, PCP had pt lie down to examine her abdomen when she reports suddenly becoming dizzy, room spinning, vomiting and as per pt systolic BP at the time was 200. PCP called EMS. Pt states symptoms are persistent, present at rest but worse with head movement and positional changes. Denies HA, chest pain, SOB, abd pain (contrary to triage note), cough, fever/chills, recent travel, known sick contacts, urinary symptoms, numbness/tingling, weakness. Feels well now.    + troponin likely 2/2 HTN urgency -- trended down  CVA ruled out  Likely vertigo  Patient unable to do MRI b/c cannot lay flat for extended period of time  Neuro to arrange for outpatient stand-up MRI    >35 minutes spent on discharge

## 2022-07-06 NOTE — PROGRESS NOTE ADULT - PROBLEM SELECTOR PLAN 1
Check ECHO  Serial enzymes -- trending down  Neuro eval  PT  Further work-up/management pending clinical course.

## 2022-07-06 NOTE — DISCHARGE NOTE PROVIDER - CARE PROVIDERS DIRECT ADDRESSES
,DirectAddress_Unknown,[email or web address] ,DirectAddress_Unknown,[email or web address],DirectAddress_Unknown

## 2022-08-08 RX ORDER — FUROSEMIDE 40 MG
1 TABLET ORAL
Qty: 0 | Refills: 0 | DISCHARGE

## 2022-08-08 RX ORDER — FLUTICASONE PROPIONATE 50 MCG
1 SPRAY, SUSPENSION NASAL
Qty: 0 | Refills: 0 | DISCHARGE

## 2022-08-08 RX ORDER — GABAPENTIN 400 MG/1
1 CAPSULE ORAL
Qty: 0 | Refills: 0 | DISCHARGE

## 2022-08-08 RX ORDER — BACLOFEN 100 %
1 POWDER (GRAM) MISCELLANEOUS
Qty: 0 | Refills: 0 | DISCHARGE

## 2022-08-08 RX ORDER — LOSARTAN POTASSIUM 100 MG/1
1 TABLET, FILM COATED ORAL
Qty: 0 | Refills: 0 | DISCHARGE

## 2022-08-08 RX ORDER — METFORMIN HYDROCHLORIDE 850 MG/1
1 TABLET ORAL
Qty: 0 | Refills: 0 | DISCHARGE

## 2022-08-08 RX ORDER — PANTOPRAZOLE SODIUM 20 MG/1
1 TABLET, DELAYED RELEASE ORAL
Qty: 0 | Refills: 0 | DISCHARGE

## 2022-08-08 RX ORDER — FLUOXETINE HCL 10 MG
1 CAPSULE ORAL
Qty: 0 | Refills: 0 | DISCHARGE

## 2022-08-08 RX ORDER — CHOLECALCIFEROL (VITAMIN D3) 125 MCG
1 CAPSULE ORAL
Qty: 0 | Refills: 0 | DISCHARGE

## 2022-08-08 RX ORDER — CALCIUM CARBONATE 500(1250)
1 TABLET ORAL
Qty: 0 | Refills: 0 | DISCHARGE

## 2022-08-08 RX ORDER — ARIPIPRAZOLE 15 MG/1
1 TABLET ORAL
Qty: 0 | Refills: 0 | DISCHARGE

## 2022-08-08 RX ORDER — ALBUTEROL 90 UG/1
2 AEROSOL, METERED ORAL
Qty: 0 | Refills: 0 | DISCHARGE

## 2022-10-10 NOTE — PATIENT PROFILE ADULT - NSPROPTRIGHTREPPHONE_GEN_A_NUR
Prescription for additional Valtrex x 5 days transmitted to the patient's pharmacy. Please notify the patient. [phone]

## 2022-10-13 NOTE — ED ADULT TRIAGE NOTE - TEMPERATURE IN FAHRENHEIT (DEGREES F)
89 Patient presented with acute onset of right flank pain.  Required labs, imaging, meds.  No acute findings.  Will discharge with outpatient follow-up.

## 2023-06-16 ENCOUNTER — OFFICE (OUTPATIENT)
Dept: URBAN - METROPOLITAN AREA CLINIC 109 | Facility: CLINIC | Age: 78
Setting detail: OPHTHALMOLOGY
End: 2023-06-16
Payer: MEDICARE

## 2023-06-16 DIAGNOSIS — E13.39: ICD-10-CM

## 2023-06-16 DIAGNOSIS — H02.834: ICD-10-CM

## 2023-06-16 DIAGNOSIS — H02.831: ICD-10-CM

## 2023-06-16 DIAGNOSIS — H40.013: ICD-10-CM

## 2023-06-16 DIAGNOSIS — H25.13: ICD-10-CM

## 2023-06-16 DIAGNOSIS — H02.832: ICD-10-CM

## 2023-06-16 DIAGNOSIS — H02.835: ICD-10-CM

## 2023-06-16 PROCEDURE — 92020 GONIOSCOPY: CPT | Performed by: OPHTHALMOLOGY

## 2023-06-16 PROCEDURE — 76514 ECHO EXAM OF EYE THICKNESS: CPT | Performed by: OPHTHALMOLOGY

## 2023-06-16 PROCEDURE — 99213 OFFICE O/P EST LOW 20 MIN: CPT | Performed by: OPHTHALMOLOGY

## 2023-06-16 PROCEDURE — 92133 CPTRZD OPH DX IMG PST SGM ON: CPT | Performed by: OPHTHALMOLOGY

## 2023-06-16 ASSESSMENT — SPHEQUIV_DERIVED
OD_SPHEQUIV: 0.375
OS_SPHEQUIV: 1
OD_SPHEQUIV: 0.75
OS_SPHEQUIV: 0.375

## 2023-06-16 ASSESSMENT — REFRACTION_AUTOREFRACTION
OD_SPHERE: +1.00
OS_CYLINDER: -1.75
OS_SPHERE: +1.25
OD_CYLINDER: -1.25
OD_AXIS: 39
OS_AXIS: 120

## 2023-06-16 ASSESSMENT — VISUAL ACUITY
OD_BCVA: 20/60-2
OS_BCVA: 20/50-2

## 2023-06-16 ASSESSMENT — PACHYMETRY
OS_CT_UM: 525
OD_CT_CORRECTION: 1
OS_CT_CORRECTION: 1
OD_CT_UM: 525

## 2023-06-16 ASSESSMENT — REFRACTION_MANIFEST
OD_SPHERE: +1.00
OS_CYLINDER: -0.50
OS_AXIS: 120
OS_SPHERE: +1.25
OS_VA1: 20/70-
OD_CYLINDER: -0.50
OD_VA1: 20/50+
OD_AXIS: 90

## 2023-06-16 ASSESSMENT — CONFRONTATIONAL VISUAL FIELD TEST (CVF)
OD_FINDINGS: FULL
OS_FINDINGS: FULL

## 2023-06-30 ENCOUNTER — OFFICE (OUTPATIENT)
Dept: URBAN - METROPOLITAN AREA CLINIC 109 | Facility: CLINIC | Age: 78
Setting detail: OPHTHALMOLOGY
End: 2023-06-30
Payer: MEDICARE

## 2023-06-30 DIAGNOSIS — H25.13: ICD-10-CM

## 2023-06-30 PROBLEM — H02.831 DERMATOCHALASIS; RIGHT UPPER LID, RIGHT LOWER LID, LEFT UPPER LID, LEFT LOWER LID: Status: ACTIVE | Noted: 2023-06-16

## 2023-06-30 PROBLEM — H02.832 DERMATOCHALASIS; RIGHT UPPER LID, RIGHT LOWER LID, LEFT UPPER LID, LEFT LOWER LID: Status: ACTIVE | Noted: 2023-06-16

## 2023-06-30 PROBLEM — H02.834 DERMATOCHALASIS; RIGHT UPPER LID, RIGHT LOWER LID, LEFT UPPER LID, LEFT LOWER LID: Status: ACTIVE | Noted: 2023-06-16

## 2023-06-30 PROBLEM — H02.835 DERMATOCHALASIS; RIGHT UPPER LID, RIGHT LOWER LID, LEFT UPPER LID, LEFT LOWER LID: Status: ACTIVE | Noted: 2023-06-16

## 2023-06-30 PROBLEM — H40.013: Status: ACTIVE | Noted: 2023-06-16

## 2023-06-30 PROCEDURE — 99214 OFFICE O/P EST MOD 30 MIN: CPT | Performed by: OPHTHALMOLOGY

## 2023-06-30 ASSESSMENT — CONFRONTATIONAL VISUAL FIELD TEST (CVF)
OS_FINDINGS: FULL
OD_FINDINGS: FULL

## 2023-06-30 ASSESSMENT — REFRACTION_AUTOREFRACTION
OS_AXIS: 120
OD_SPHERE: +1.00
OS_SPHERE: +1.25
OD_AXIS: 073
OS_CYLINDER: -1.75
OD_CYLINDER: -1.00

## 2023-06-30 ASSESSMENT — REFRACTION_MANIFEST
OD_SPHERE: +1.00
OD_AXIS: 90
OS_AXIS: 120
OS_VA1: 20/70-
OD_VA1: 20/50+
OS_SPHERE: +1.25
OS_CYLINDER: -0.50
OD_CYLINDER: -0.50

## 2023-06-30 ASSESSMENT — SPHEQUIV_DERIVED
OS_SPHEQUIV: 1
OD_SPHEQUIV: 0.75
OS_SPHEQUIV: 0.375
OD_SPHEQUIV: 0.5

## 2023-06-30 ASSESSMENT — VISUAL ACUITY
OD_BCVA: 20/70-2
OS_BCVA: 20/50-2

## 2023-07-24 ENCOUNTER — OFFICE (OUTPATIENT)
Dept: URBAN - METROPOLITAN AREA CLINIC 109 | Facility: CLINIC | Age: 78
Setting detail: OPHTHALMOLOGY
End: 2023-07-24
Payer: MEDICARE

## 2023-07-24 VITALS — HEIGHT: 55 IN

## 2023-07-24 DIAGNOSIS — E13.39: ICD-10-CM

## 2023-07-24 DIAGNOSIS — H52.212: ICD-10-CM

## 2023-07-24 DIAGNOSIS — H25.13: ICD-10-CM

## 2023-07-24 DIAGNOSIS — H25.12: ICD-10-CM

## 2023-07-24 PROCEDURE — 92136 OPHTHALMIC BIOMETRY: CPT | Performed by: OPHTHALMOLOGY

## 2023-07-24 PROCEDURE — 92134 CPTRZ OPH DX IMG PST SGM RTA: CPT | Performed by: OPHTHALMOLOGY

## 2023-07-24 PROCEDURE — 99213 OFFICE O/P EST LOW 20 MIN: CPT | Performed by: OPHTHALMOLOGY

## 2023-07-24 PROCEDURE — 92025 CPTRIZED CORNEAL TOPOGRAPHY: CPT | Performed by: OPHTHALMOLOGY

## 2023-07-24 ASSESSMENT — AXIALLENGTH_DERIVED
OS_AL: 22.9
OD_AL: 23.0166
OD_AL: 23.0166

## 2023-07-24 ASSESSMENT — REFRACTION_AUTOREFRACTION
OD_CYLINDER: -1.50
OD_AXIS: 73
OD_SPHERE: +1.50

## 2023-07-24 ASSESSMENT — REFRACTION_MANIFEST
OS_SPHERE: +1.25
OS_CYLINDER: -0.50
OD_CYLINDER: -0.50
OS_VA1: 20/NI
OD_SPHERE: +1.00
OS_AXIS: 120
OD_AXIS: 90
OD_VA1: 20/50+

## 2023-07-24 ASSESSMENT — SPHEQUIV_DERIVED
OS_SPHEQUIV: 1
OD_SPHEQUIV: 0.75
OD_SPHEQUIV: 0.75

## 2023-07-24 ASSESSMENT — KERATOMETRY
OD_CYLPOWER_DEGREES: 0.88
OD_K2POWER_DIOPTERS: 44.75
OD_CYLAXISANGLE_DEGREES: 112
OS_AXISANGLE_DEGREES: 34
OD_AXISANGLE2_DEGREES: 112
OS_AXISANGLE_DEGREES: 31
OS_K1K2_AVERAGE: 44.37
OS_K1POWER_DIOPTERS: 43.62
OD_K1POWER_DIOPTERS: 43.87
OS_K1POWER_DIOPTERS: 43.87
OS_CYLAXISANGLE_DEGREES: 124
OD_AXISANGLE_DEGREES: 22
OS_K2POWER_DIOPTERS: 44.87
OS_CYLPOWER_DEGREES: 1
OD_K2POWER_DIOPTERS: 44.62
OS_K2POWER_DIOPTERS: 44.87
OD_K1K2_AVERAGE: 44.31
OS_AXISANGLE2_DEGREES: 124
OD_K1POWER_DIOPTERS: 43.87
OD_AXISANGLE_DEGREES: 125

## 2023-07-24 ASSESSMENT — VISUAL ACUITY
OS_BCVA: 20/60+1
OD_BCVA: 20/80-1

## 2023-07-24 ASSESSMENT — CONFRONTATIONAL VISUAL FIELD TEST (CVF)
OD_FINDINGS: FULL
OS_FINDINGS: FULL

## 2023-08-08 ENCOUNTER — AMBULATORY SURGERY CENTER (OUTPATIENT)
Dept: URBAN - METROPOLITAN AREA SURGERY 19 | Facility: SURGERY | Age: 78
Setting detail: OPHTHALMOLOGY
End: 2023-08-08
Payer: MEDICARE

## 2023-08-08 DIAGNOSIS — H25.12: ICD-10-CM

## 2023-08-08 PROCEDURE — 66984 XCAPSL CTRC RMVL W/O ECP: CPT | Performed by: OPHTHALMOLOGY

## 2023-08-09 ENCOUNTER — OFFICE (OUTPATIENT)
Dept: URBAN - METROPOLITAN AREA CLINIC 109 | Facility: CLINIC | Age: 78
Setting detail: OPHTHALMOLOGY
End: 2023-08-09
Payer: MEDICARE

## 2023-08-09 ENCOUNTER — RX ONLY (RX ONLY)
Age: 78
End: 2023-08-09

## 2023-08-09 DIAGNOSIS — Z96.1: ICD-10-CM

## 2023-08-09 PROCEDURE — 99024 POSTOP FOLLOW-UP VISIT: CPT | Performed by: OPHTHALMOLOGY

## 2023-08-09 ASSESSMENT — SPHEQUIV_DERIVED
OS_SPHEQUIV: 1
OD_SPHEQUIV: 0.75
OD_SPHEQUIV: 0.75

## 2023-08-09 ASSESSMENT — KERATOMETRY
OD_K1POWER_DIOPTERS: 43.87
OS_K1POWER_DIOPTERS: 43.62
OS_K2POWER_DIOPTERS: 44.87
OS_AXISANGLE_DEGREES: 31
OD_K2POWER_DIOPTERS: 44.62
OD_AXISANGLE_DEGREES: 125

## 2023-08-09 ASSESSMENT — PACHYMETRY
OD_CT_CORRECTION: 1
OS_CT_UM: 525
OS_CT_CORRECTION: 1
OD_CT_UM: 525

## 2023-08-09 ASSESSMENT — CONFRONTATIONAL VISUAL FIELD TEST (CVF)
OD_FINDINGS: FULL
OS_FINDINGS: FULL

## 2023-08-09 ASSESSMENT — REFRACTION_AUTOREFRACTION
OD_SPHERE: +1.50
OD_AXIS: 73
OD_CYLINDER: -1.50

## 2023-08-09 ASSESSMENT — REFRACTION_MANIFEST
OS_VA1: 20/NI
OS_SPHERE: +1.25
OS_AXIS: 120
OD_SPHERE: +1.00
OD_AXIS: 90
OD_CYLINDER: -0.50
OD_VA1: 20/50+
OS_CYLINDER: -0.50

## 2023-08-09 ASSESSMENT — AXIALLENGTH_DERIVED
OS_AL: 22.9468
OD_AL: 23.0393
OD_AL: 23.0393

## 2023-08-09 ASSESSMENT — VISUAL ACUITY
OS_BCVA: 20/60+1
OD_BCVA: 20/40+2

## 2023-08-09 ASSESSMENT — TONOMETRY: OS_IOP_MMHG: 15

## 2023-08-16 ENCOUNTER — OFFICE (OUTPATIENT)
Dept: URBAN - METROPOLITAN AREA CLINIC 109 | Facility: CLINIC | Age: 78
Setting detail: OPHTHALMOLOGY
End: 2023-08-16
Payer: MEDICARE

## 2023-08-16 DIAGNOSIS — Z96.1: ICD-10-CM

## 2023-08-16 DIAGNOSIS — H25.11: ICD-10-CM

## 2023-08-16 PROCEDURE — 92136 OPHTHALMIC BIOMETRY: CPT | Performed by: OPHTHALMOLOGY

## 2023-08-16 PROCEDURE — 99024 POSTOP FOLLOW-UP VISIT: CPT | Performed by: OPHTHALMOLOGY

## 2023-08-16 ASSESSMENT — REFRACTION_AUTOREFRACTION
OS_AXIS: 95
OD_AXIS: 73
OS_SPHERE: +0.25
OD_CYLINDER: -1.50
OD_SPHERE: +1.50
OS_CYLINDER: -1.00

## 2023-08-16 ASSESSMENT — CONFRONTATIONAL VISUAL FIELD TEST (CVF)
OD_FINDINGS: FULL
OS_FINDINGS: FULL

## 2023-08-16 ASSESSMENT — KERATOMETRY
OS_AXISANGLE_DEGREES: 31
OD_K2POWER_DIOPTERS: 44.62
OS_K1POWER_DIOPTERS: 43.62
OD_AXISANGLE_DEGREES: 125
OD_K1POWER_DIOPTERS: 43.87
OS_K2POWER_DIOPTERS: 44.87

## 2023-08-16 ASSESSMENT — VISUAL ACUITY
OS_BCVA: 20/60+1
OD_BCVA: 20/25

## 2023-08-16 ASSESSMENT — SPHEQUIV_DERIVED
OD_SPHEQUIV: 0.75
OD_SPHEQUIV: 0.75
OS_SPHEQUIV: -0.25
OS_SPHEQUIV: 1

## 2023-08-16 ASSESSMENT — REFRACTION_MANIFEST
OS_CYLINDER: -0.50
OD_SPHERE: +1.00
OD_CYLINDER: -0.50
OS_SPHERE: +1.25
OS_AXIS: 120
OD_VA1: 20/50+
OS_VA1: 20/NI
OD_AXIS: 90

## 2023-08-16 ASSESSMENT — AXIALLENGTH_DERIVED
OS_AL: 23.417
OS_AL: 22.9468
OD_AL: 23.0393
OD_AL: 23.0393

## 2023-08-22 ENCOUNTER — AMBULATORY SURGERY CENTER (OUTPATIENT)
Dept: URBAN - METROPOLITAN AREA SURGERY 19 | Facility: SURGERY | Age: 78
Setting detail: OPHTHALMOLOGY
End: 2023-08-22
Payer: MEDICARE

## 2023-08-22 DIAGNOSIS — H25.11: ICD-10-CM

## 2023-08-22 PROCEDURE — 66984 XCAPSL CTRC RMVL W/O ECP: CPT | Performed by: OPHTHALMOLOGY

## 2023-08-23 ENCOUNTER — RX ONLY (RX ONLY)
Age: 78
End: 2023-08-23

## 2023-08-23 ENCOUNTER — OFFICE (OUTPATIENT)
Dept: URBAN - METROPOLITAN AREA CLINIC 109 | Facility: CLINIC | Age: 78
Setting detail: OPHTHALMOLOGY
End: 2023-08-23
Payer: MEDICARE

## 2023-08-23 VITALS — HEIGHT: 55 IN

## 2023-08-23 DIAGNOSIS — Z96.1: ICD-10-CM

## 2023-08-23 PROCEDURE — 99024 POSTOP FOLLOW-UP VISIT: CPT | Performed by: OPHTHALMOLOGY

## 2023-08-23 ASSESSMENT — REFRACTION_MANIFEST
OD_CYLINDER: -0.50
OS_VA1: 20/NI
OS_AXIS: 120
OS_CYLINDER: -0.50
OD_AXIS: 90
OD_SPHERE: +1.00
OS_SPHERE: +1.25
OD_VA1: 20/50+

## 2023-08-23 ASSESSMENT — SPHEQUIV_DERIVED
OD_SPHEQUIV: 0.75
OS_SPHEQUIV: 1
OD_SPHEQUIV: 0.75
OS_SPHEQUIV: -0.25

## 2023-08-23 ASSESSMENT — VISUAL ACUITY
OD_BCVA: 20/30-1
OS_BCVA: 20/50-2

## 2023-08-23 ASSESSMENT — REFRACTION_AUTOREFRACTION
OS_CYLINDER: -1.00
OD_AXIS: 73
OD_CYLINDER: -1.50
OD_SPHERE: +1.50
OS_SPHERE: +0.25
OS_AXIS: 95

## 2023-08-23 ASSESSMENT — KERATOMETRY
OD_AXISANGLE_DEGREES: 125
OS_AXISANGLE_DEGREES: 31
OD_K2POWER_DIOPTERS: 44.62
OS_K2POWER_DIOPTERS: 44.87
OD_K1POWER_DIOPTERS: 43.87
OS_K1POWER_DIOPTERS: 43.62

## 2023-08-23 ASSESSMENT — CONFRONTATIONAL VISUAL FIELD TEST (CVF)
OD_FINDINGS: FULL
OS_FINDINGS: FULL

## 2023-08-23 ASSESSMENT — TONOMETRY: OD_IOP_MMHG: 16

## 2023-08-23 ASSESSMENT — AXIALLENGTH_DERIVED
OD_AL: 23.0393
OS_AL: 22.9468
OD_AL: 23.0393
OS_AL: 23.417

## 2023-08-23 ASSESSMENT — PACHYMETRY
OD_CT_UM: 525
OD_CT_CORRECTION: 1
OS_CT_UM: 525
OS_CT_CORRECTION: 1

## 2023-09-11 ENCOUNTER — OFFICE (OUTPATIENT)
Dept: URBAN - METROPOLITAN AREA CLINIC 109 | Facility: CLINIC | Age: 78
Setting detail: OPHTHALMOLOGY
End: 2023-09-11
Payer: MEDICARE

## 2023-09-11 VITALS — HEIGHT: 55 IN

## 2023-09-11 DIAGNOSIS — Z96.1: ICD-10-CM

## 2023-09-11 PROCEDURE — 99024 POSTOP FOLLOW-UP VISIT: CPT | Performed by: OPHTHALMOLOGY

## 2023-09-11 ASSESSMENT — AXIALLENGTH_DERIVED
OD_AL: 23.0393
OD_AL: 22.9468
OS_AL: 23.2739
OS_AL: 22.9468

## 2023-09-11 ASSESSMENT — REFRACTION_AUTOREFRACTION
OD_AXIS: 87
OS_SPHERE: +1.00
OD_CYLINDER: -1.00
OD_SPHERE: +1.50
OS_CYLINDER: -1.75
OS_AXIS: 111

## 2023-09-11 ASSESSMENT — CONFRONTATIONAL VISUAL FIELD TEST (CVF)
OS_FINDINGS: FULL
OD_FINDINGS: FULL

## 2023-09-11 ASSESSMENT — REFRACTION_MANIFEST
OS_AXIS: 120
OS_CYLINDER: -0.50
OD_AXIS: 90
OS_VA1: 20/NI
OS_SPHERE: +1.25
OD_CYLINDER: -0.50
OD_VA1: 20/50+
OD_SPHERE: +1.00

## 2023-09-11 ASSESSMENT — PACHYMETRY
OD_CT_UM: 525
OS_CT_CORRECTION: 1
OD_CT_CORRECTION: 1
OS_CT_UM: 525

## 2023-09-11 ASSESSMENT — KERATOMETRY
OD_K1POWER_DIOPTERS: 43.87
OS_K2POWER_DIOPTERS: 44.87
OD_AXISANGLE_DEGREES: 125
OS_AXISANGLE_DEGREES: 31
OD_K2POWER_DIOPTERS: 44.62
OS_K1POWER_DIOPTERS: 43.62

## 2023-09-11 ASSESSMENT — SPHEQUIV_DERIVED
OS_SPHEQUIV: 0.125
OD_SPHEQUIV: 1
OS_SPHEQUIV: 1
OD_SPHEQUIV: 0.75

## 2023-09-11 ASSESSMENT — VISUAL ACUITY
OD_BCVA: 20/30-2
OS_BCVA: 20/30-2

## 2024-09-20 ENCOUNTER — OFFICE (OUTPATIENT)
Dept: URBAN - METROPOLITAN AREA CLINIC 109 | Facility: CLINIC | Age: 79
Setting detail: OPHTHALMOLOGY
End: 2024-09-20
Payer: MEDICARE

## 2024-09-20 DIAGNOSIS — E11.9: ICD-10-CM

## 2024-09-20 DIAGNOSIS — H40.013: ICD-10-CM

## 2024-09-20 PROCEDURE — 92133 CPTRZD OPH DX IMG PST SGM ON: CPT | Performed by: OPHTHALMOLOGY

## 2024-09-20 PROCEDURE — 99213 OFFICE O/P EST LOW 20 MIN: CPT | Performed by: OPHTHALMOLOGY

## 2024-09-20 ASSESSMENT — CONFRONTATIONAL VISUAL FIELD TEST (CVF)
OD_FINDINGS: FULL
OS_FINDINGS: FULL

## 2024-12-05 PROBLEM — I10 ESSENTIAL (PRIMARY) HYPERTENSION: Chronic | Status: ACTIVE | Noted: 2022-07-05

## 2025-01-13 ENCOUNTER — APPOINTMENT (OUTPATIENT)
Dept: GASTROENTEROLOGY | Facility: CLINIC | Age: 80
End: 2025-01-13
Payer: MEDICARE

## 2025-01-13 ENCOUNTER — NON-APPOINTMENT (OUTPATIENT)
Age: 80
End: 2025-01-13

## 2025-01-13 VITALS
OXYGEN SATURATION: 96 % | DIASTOLIC BLOOD PRESSURE: 59 MMHG | WEIGHT: 196 LBS | HEART RATE: 111 BPM | HEIGHT: 62 IN | SYSTOLIC BLOOD PRESSURE: 124 MMHG | BODY MASS INDEX: 36.07 KG/M2

## 2025-01-13 DIAGNOSIS — R73.03 PREDIABETES.: ICD-10-CM

## 2025-01-13 DIAGNOSIS — I10 ESSENTIAL (PRIMARY) HYPERTENSION: ICD-10-CM

## 2025-01-13 DIAGNOSIS — D50.9 IRON DEFICIENCY ANEMIA, UNSPECIFIED: ICD-10-CM

## 2025-01-13 DIAGNOSIS — Z78.9 OTHER SPECIFIED HEALTH STATUS: ICD-10-CM

## 2025-01-13 DIAGNOSIS — Z87.891 PERSONAL HISTORY OF NICOTINE DEPENDENCE: ICD-10-CM

## 2025-01-13 DIAGNOSIS — E78.5 HYPERLIPIDEMIA, UNSPECIFIED: ICD-10-CM

## 2025-01-13 DIAGNOSIS — J44.9 CHRONIC OBSTRUCTIVE PULMONARY DISEASE, UNSPECIFIED: ICD-10-CM

## 2025-01-13 PROCEDURE — 99204 OFFICE O/P NEW MOD 45 MIN: CPT

## 2025-01-13 RX ORDER — PANTOPRAZOLE 40 MG/1
40 TABLET, DELAYED RELEASE ORAL
Qty: 30 | Refills: 0 | Status: ACTIVE | COMMUNITY
Start: 2024-08-28

## 2025-01-13 RX ORDER — ROSUVASTATIN CALCIUM 5 MG/1
5 TABLET, FILM COATED ORAL
Qty: 30 | Refills: 0 | Status: ACTIVE | COMMUNITY
Start: 2025-01-12

## 2025-01-13 RX ORDER — OLMESARTAN MEDOXOMIL 5 MG/1
5 TABLET, FILM COATED ORAL
Qty: 30 | Refills: 0 | Status: ACTIVE | COMMUNITY
Start: 2024-08-16

## 2025-01-13 RX ORDER — METFORMIN HYDROCHLORIDE 500 MG/1
500 TABLET, COATED ORAL
Qty: 360 | Refills: 0 | Status: ACTIVE | COMMUNITY
Start: 2025-01-08

## 2025-01-13 RX ORDER — POLYETHYLENE GLYCOL 3350 AND ELECTROLYTES WITH LEMON FLAVOR 236; 22.74; 6.74; 5.86; 2.97 G/4L; G/4L; G/4L; G/4L; G/4L
236 POWDER, FOR SOLUTION ORAL
Qty: 1 | Refills: 0 | Status: ACTIVE | COMMUNITY
Start: 2025-01-13 | End: 1900-01-01

## 2025-01-13 RX ORDER — FLUOXETINE HYDROCHLORIDE 40 MG/1
40 CAPSULE ORAL
Qty: 90 | Refills: 0 | Status: ACTIVE | COMMUNITY
Start: 2024-08-14

## 2025-01-13 RX ORDER — VARENICLINE TARTRATE 1 MG/1
1 TABLET, FILM COATED ORAL
Qty: 60 | Refills: 0 | Status: ACTIVE | COMMUNITY
Start: 2024-10-14

## 2025-01-13 RX ORDER — FLUTICASONE PROPIONATE 50 UG/1
50 SPRAY, METERED NASAL
Qty: 48 | Refills: 0 | Status: ACTIVE | COMMUNITY
Start: 2024-08-27

## 2025-01-13 RX ORDER — GABAPENTIN 300 MG/1
300 CAPSULE ORAL
Qty: 90 | Refills: 0 | Status: ACTIVE | COMMUNITY
Start: 2024-06-19

## 2025-01-13 RX ORDER — CALCIUM CARBONATE 500(1250)
500 TABLET ORAL
Qty: 180 | Refills: 0 | Status: ACTIVE | COMMUNITY
Start: 2024-10-14

## 2025-01-13 RX ORDER — ARIPIPRAZOLE 2 MG/1
2 TABLET ORAL
Qty: 90 | Refills: 0 | Status: ACTIVE | COMMUNITY
Start: 2025-01-08

## 2025-01-13 RX ORDER — FLUTICASONE FUROATE, UMECLIDINIUM BROMIDE AND VILANTEROL TRIFENATATE 100; 62.5; 25 UG/1; UG/1; UG/1
100-62.5-25 POWDER RESPIRATORY (INHALATION)
Qty: 60 | Refills: 0 | Status: ACTIVE | COMMUNITY
Start: 2024-08-28

## 2025-01-13 RX ORDER — BACLOFEN 10 MG/1
10 TABLET ORAL
Qty: 180 | Refills: 0 | Status: ACTIVE | COMMUNITY
Start: 2024-05-03

## 2025-01-30 PROBLEM — E11.9 TYPE 2 DIABETES MELLITUS WITHOUT COMPLICATIONS: Chronic | Status: ACTIVE | Noted: 2022-07-05

## 2025-02-11 ENCOUNTER — OUTPATIENT (OUTPATIENT)
Dept: OUTPATIENT SERVICES | Facility: HOSPITAL | Age: 80
LOS: 1 days | End: 2025-02-11
Payer: MEDICARE

## 2025-02-11 VITALS
RESPIRATION RATE: 16 BRPM | SYSTOLIC BLOOD PRESSURE: 138 MMHG | OXYGEN SATURATION: 96 % | HEIGHT: 61 IN | WEIGHT: 197.09 LBS | TEMPERATURE: 98 F | DIASTOLIC BLOOD PRESSURE: 80 MMHG | HEART RATE: 88 BPM

## 2025-02-11 DIAGNOSIS — Z85.828 PERSONAL HISTORY OF OTHER MALIGNANT NEOPLASM OF SKIN: Chronic | ICD-10-CM

## 2025-02-11 DIAGNOSIS — I10 ESSENTIAL (PRIMARY) HYPERTENSION: ICD-10-CM

## 2025-02-11 DIAGNOSIS — D50.9 IRON DEFICIENCY ANEMIA, UNSPECIFIED: ICD-10-CM

## 2025-02-11 DIAGNOSIS — Z87.09 PERSONAL HISTORY OF OTHER DISEASES OF THE RESPIRATORY SYSTEM: ICD-10-CM

## 2025-02-11 DIAGNOSIS — Z98.49 CATARACT EXTRACTION STATUS, UNSPECIFIED EYE: Chronic | ICD-10-CM

## 2025-02-11 DIAGNOSIS — Z01.818 ENCOUNTER FOR OTHER PREPROCEDURAL EXAMINATION: ICD-10-CM

## 2025-02-11 DIAGNOSIS — E11.9 TYPE 2 DIABETES MELLITUS WITHOUT COMPLICATIONS: ICD-10-CM

## 2025-02-11 DIAGNOSIS — Z98.890 OTHER SPECIFIED POSTPROCEDURAL STATES: Chronic | ICD-10-CM

## 2025-02-11 DIAGNOSIS — Z86.2 PERSONAL HISTORY OF DISEASES OF THE BLOOD AND BLOOD-FORMING ORGANS AND CERTAIN DISORDERS INVOLVING THE IMMUNE MECHANISM: ICD-10-CM

## 2025-02-11 LAB
A1C WITH ESTIMATED AVERAGE GLUCOSE RESULT: 6.5 % — HIGH (ref 4–5.6)
ALBUMIN SERPL ELPH-MCNC: 4.1 G/DL — SIGNIFICANT CHANGE UP (ref 3.3–5)
ALP SERPL-CCNC: 79 U/L — SIGNIFICANT CHANGE UP (ref 40–120)
ALT FLD-CCNC: 25 U/L — SIGNIFICANT CHANGE UP (ref 10–45)
ANION GAP SERPL CALC-SCNC: 14 MMOL/L — SIGNIFICANT CHANGE UP (ref 5–17)
AST SERPL-CCNC: 27 U/L — SIGNIFICANT CHANGE UP (ref 10–40)
BILIRUB SERPL-MCNC: 0.3 MG/DL — SIGNIFICANT CHANGE UP (ref 0.2–1.2)
BUN SERPL-MCNC: 12 MG/DL — SIGNIFICANT CHANGE UP (ref 7–23)
CALCIUM SERPL-MCNC: 10.1 MG/DL — SIGNIFICANT CHANGE UP (ref 8.4–10.5)
CHLORIDE SERPL-SCNC: 95 MMOL/L — LOW (ref 96–108)
CO2 SERPL-SCNC: 24 MMOL/L — SIGNIFICANT CHANGE UP (ref 22–31)
CREAT SERPL-MCNC: 0.9 MG/DL — SIGNIFICANT CHANGE UP (ref 0.5–1.3)
EGFR: 65 ML/MIN/1.73M2 — SIGNIFICANT CHANGE UP
ESTIMATED AVERAGE GLUCOSE: 140 MG/DL — HIGH (ref 68–114)
GLUCOSE SERPL-MCNC: 109 MG/DL — HIGH (ref 70–99)
HCT VFR BLD CALC: 34.6 % — SIGNIFICANT CHANGE UP (ref 34.5–45)
HGB BLD-MCNC: 10.1 G/DL — LOW (ref 11.5–15.5)
MCHC RBC-ENTMCNC: 23.5 PG — LOW (ref 27–34)
MCHC RBC-ENTMCNC: 29.2 G/DL — LOW (ref 32–36)
MCV RBC AUTO: 80.5 FL — SIGNIFICANT CHANGE UP (ref 80–100)
NRBC BLD AUTO-RTO: 0 /100 WBCS — SIGNIFICANT CHANGE UP (ref 0–0)
PLATELET # BLD AUTO: 424 K/UL — HIGH (ref 150–400)
POTASSIUM SERPL-MCNC: 4.9 MMOL/L — SIGNIFICANT CHANGE UP (ref 3.5–5.3)
POTASSIUM SERPL-SCNC: 4.9 MMOL/L — SIGNIFICANT CHANGE UP (ref 3.5–5.3)
PROT SERPL-MCNC: 7.7 G/DL — SIGNIFICANT CHANGE UP (ref 6–8.3)
RBC # BLD: 4.3 M/UL — SIGNIFICANT CHANGE UP (ref 3.8–5.2)
RBC # FLD: 17.4 % — HIGH (ref 10.3–14.5)
SODIUM SERPL-SCNC: 133 MMOL/L — LOW (ref 135–145)
WBC # BLD: 10.64 K/UL — HIGH (ref 3.8–10.5)
WBC # FLD AUTO: 10.64 K/UL — HIGH (ref 3.8–10.5)

## 2025-02-11 PROCEDURE — 83036 HEMOGLOBIN GLYCOSYLATED A1C: CPT

## 2025-02-11 PROCEDURE — G0463: CPT

## 2025-02-11 PROCEDURE — 85027 COMPLETE CBC AUTOMATED: CPT

## 2025-02-11 PROCEDURE — 80053 COMPREHEN METABOLIC PANEL: CPT

## 2025-02-11 NOTE — H&P PST ADULT - NSICDXPASTMEDICALHX_GEN_ALL_CORE_FT
PAST MEDICAL HISTORY:  H/O sciatica     History of chronic back pain     HLD (hyperlipidemia)     Hypertension     Nasal polyps     Nasal septal deviation     T2DM (type 2 diabetes mellitus)      PAST MEDICAL HISTORY:  H/O right bundle branch block     H/O sciatica     History of chronic back pain     HLD (hyperlipidemia)     Hypertension     Nasal polyps     Nasal septal deviation     T2DM (type 2 diabetes mellitus)      PAST MEDICAL HISTORY:  H/O carotid stenosis     H/O right bundle branch block     H/O sciatica     History of chronic back pain     HLD (hyperlipidemia)     Hypertension     Nasal polyps     Nasal septal deviation     T2DM (type 2 diabetes mellitus)

## 2025-02-11 NOTE — H&P PST ADULT - HEIGHT IN CM
Surgeon/Pathologist Verbiage (Will Incorporate Name Of Surgeon From Intro If Not Blank): operated in two distinct and integrated capacities as the surgeon and pathologist. [phone]

## 2025-02-11 NOTE — H&P PST ADULT - NSICDXPASTSURGICALHX_GEN_ALL_CORE_FT
PAST SURGICAL HISTORY:  H/O colonoscopy     History of basal cell carcinoma of skin     S/P cataract surgery     S/P nasal septoplasty

## 2025-02-11 NOTE — H&P PST ADULT - SOURCE OF INFORMATION, PROFILE
patient pt.'s roomate, Candida, present during PST visit/patient pt.'s roommate, Candy, present during PST visit/patient

## 2025-02-11 NOTE — H&P PST ADULT - HISTORY OF PRESENT ILLNESS
septoplasty X 3, deviated septum, polyps, cheek and head X4 - basal skin, colonoscopy  feels tired all the time, chronic back pain, spine arthritis, sciatic,     pedal exercise machine every day 1-2 times a day 30 mts.short time  78 yo female, PMH HTN, HLD, T2DM, GERD, chronic back pain with sciatica and difficulty walking. COPD, (quit smoking one month ago 1PPD X 60 years), Pt. reports iron deficiency anemia - referred to hematologist and had GI consult. Pt. c/o chronic constipation and lower abdominal discomfort when having a BM. Pt. presents to PST for scheduled Endoscopy and Colonoscopy on 2/21/25. Denies recent fever, chills, chest pain, SOB, changes in bowel/urinary habits or recent exposure to COVID-19 infection. Pt. denies clotting or bleeding disorders.    80 yo female, PMH HTN, HLD, T2DM, GERD, chronic back pain with sciatica and difficulty walking. COPD, (quit smoking one month ago 1PPD X 60 years), carotid stenosis - on rosuvastatin (unable to take aspirin 2/2 NSAID use, as per casrdiology  note) Pt. reports iron deficiency anemia - referred to hematologist and had GI consult. Pt. c/o chronic constipation and lower abdominal discomfort when having a BM. Pt. presents to PST for scheduled Endoscopy and Colonoscopy on 2/21/25. Denies recent fever, chills, chest pain, SOB, changes in bowel/urinary habits or recent exposure to COVID-19 infection. Pt. denies clotting or bleeding disorders.

## 2025-02-11 NOTE — H&P PST ADULT - PROBLEM SELECTOR PLAN 1
Endoscopy and Colonoscopy on 2/21/25  Pre-op instructions provided - all questions answered (pt. already has pre-op Golytely)  Pt. will continue aspirin 81 mg. during verna-op period  CBC, CMB, HgA1c done at PST

## 2025-02-11 NOTE — H&P PST ADULT - ASSESSMENT
Activity:    DASI scale:    Mallampati:    Dentition: Denies loose teeth, upper dentures - will leave at home DOS Activity: Uses pedal machine every day twice a day for 30 mts., light house chores, limited 2/2 back pain    DASI scale: 3.97    Mallampati: 3    Dentition: Denies loose teeth, upper dentures - will leave at home DOS

## 2025-02-11 NOTE — H&P PST ADULT - NEGATIVE GENERAL GENITOURINARY SYMPTOMS
no incontinence/no dysuria/normal urinary frequency no bladder infections/no dysuria/normal urinary frequency

## 2025-02-11 NOTE — H&P PST ADULT - NEGATIVE CARDIOVASCULAR SYMPTOMS
no chest pain/no palpitations/no dyspnea on exertion/no peripheral edema no chest pain/no palpitations/no dyspnea on exertion/no orthopnea/no paroxysmal nocturnal dyspnea/no peripheral edema

## 2025-02-11 NOTE — H&P PST ADULT - NEGATIVE GASTROINTESTINAL SYMPTOMS
has BM almost everyday, varies in consistency, mostly constipated/no nausea/no vomiting/no abdominal pain/no melena/no hematochezia/no steatorrhea/no jaundice no nausea/no vomiting/no abdominal pain/no melena/no hematochezia/no steatorrhea/no jaundice

## 2025-02-21 ENCOUNTER — APPOINTMENT (OUTPATIENT)
Dept: GASTROENTEROLOGY | Facility: HOSPITAL | Age: 80
End: 2025-02-21

## 2025-02-21 ENCOUNTER — TRANSCRIPTION ENCOUNTER (OUTPATIENT)
Age: 80
End: 2025-02-21

## 2025-02-21 ENCOUNTER — OUTPATIENT (OUTPATIENT)
Dept: OUTPATIENT SERVICES | Facility: HOSPITAL | Age: 80
LOS: 1 days | End: 2025-02-21
Payer: MEDICARE

## 2025-02-21 ENCOUNTER — RESULT REVIEW (OUTPATIENT)
Age: 80
End: 2025-02-21

## 2025-02-21 VITALS
RESPIRATION RATE: 25 BRPM | OXYGEN SATURATION: 97 % | SYSTOLIC BLOOD PRESSURE: 161 MMHG | DIASTOLIC BLOOD PRESSURE: 88 MMHG | HEART RATE: 101 BPM

## 2025-02-21 VITALS
OXYGEN SATURATION: 96 % | DIASTOLIC BLOOD PRESSURE: 81 MMHG | HEIGHT: 61 IN | WEIGHT: 195.99 LBS | SYSTOLIC BLOOD PRESSURE: 182 MMHG | RESPIRATION RATE: 16 BRPM | HEART RATE: 103 BPM | TEMPERATURE: 97 F

## 2025-02-21 DIAGNOSIS — D50.9 IRON DEFICIENCY ANEMIA, UNSPECIFIED: ICD-10-CM

## 2025-02-21 DIAGNOSIS — Z98.49 CATARACT EXTRACTION STATUS, UNSPECIFIED EYE: Chronic | ICD-10-CM

## 2025-02-21 DIAGNOSIS — Z85.828 PERSONAL HISTORY OF OTHER MALIGNANT NEOPLASM OF SKIN: Chronic | ICD-10-CM

## 2025-02-21 DIAGNOSIS — Z98.890 OTHER SPECIFIED POSTPROCEDURAL STATES: Chronic | ICD-10-CM

## 2025-02-21 LAB — GLUCOSE BLDC GLUCOMTR-MCNC: 103 MG/DL — HIGH (ref 70–99)

## 2025-02-21 PROCEDURE — 43239 EGD BIOPSY SINGLE/MULTIPLE: CPT | Mod: GC

## 2025-02-21 PROCEDURE — 45385 COLONOSCOPY W/LESION REMOVAL: CPT | Mod: GC

## 2025-02-21 PROCEDURE — 88305 TISSUE EXAM BY PATHOLOGIST: CPT | Mod: 26

## 2025-02-21 DEVICE — NET RETRV ROT ROTH 2.5MMX230CM: Type: IMPLANTABLE DEVICE | Status: FUNCTIONAL

## 2025-02-21 RX ORDER — SODIUM CHLORIDE 9 G/1000ML
500 INJECTION, SOLUTION INTRAVENOUS
Refills: 0 | Status: COMPLETED | OUTPATIENT
Start: 2025-02-21 | End: 2025-02-21

## 2025-02-21 RX ORDER — OLMESARTAN MEDOXOMIL 20 MG/1
2 TABLET, FILM COATED ORAL
Refills: 0 | DISCHARGE

## 2025-02-21 RX ORDER — LIDOCAINE HCL/PF 10 MG/ML
4 VIAL (ML) INJECTION ONCE
Refills: 0 | Status: COMPLETED | OUTPATIENT
Start: 2025-02-21 | End: 2025-02-21

## 2025-02-21 RX ORDER — FLUTICASONE FUROATE, UMECLIDINIUM BROMIDE AND VILANTEROL TRIFENATATE 200; 62.5; 25 UG/1; UG/1; UG/1
1 POWDER RESPIRATORY (INHALATION)
Refills: 0 | DISCHARGE

## 2025-02-21 RX ORDER — BISACODYL 5 MG
1 TABLET, DELAYED RELEASE (ENTERIC COATED) ORAL
Refills: 0 | DISCHARGE

## 2025-02-21 RX ORDER — ACETAMINOPHEN 160 MG/5ML
2 SUSPENSION ORAL
Refills: 0 | DISCHARGE

## 2025-02-21 RX ORDER — ROSUVASTATIN CALCIUM 10 MG/1
1 TABLET, FILM COATED ORAL
Refills: 0 | DISCHARGE

## 2025-02-21 RX ADMIN — SODIUM CHLORIDE 30 MILLILITER(S): 9 INJECTION, SOLUTION INTRAVENOUS at 08:45

## 2025-02-21 RX ADMIN — Medication 4 MILLILITER(S): at 08:59

## 2025-02-21 NOTE — PRE PROCEDURE NOTE - PRE PROCEDURE EVALUATION
Pre-Endoscopy Evaluation    Attending Physician:  Dr. Conor Guerrero    Procedure: EGD + Colonoscopy    Indication for Procedure: SACHA    PAST MEDICAL & SURGICAL HISTORY:  Hypertension      HLD (hyperlipidemia)      T2DM (type 2 diabetes mellitus)      Nasal septal deviation      Nasal polyps      H/O sciatica      History of chronic back pain      H/O right bundle branch block      H/O carotid stenosis      S/P nasal septoplasty      History of basal cell carcinoma of skin      H/O colonoscopy      S/P cataract surgery          Allergies    No Known Allergies    Intolerances        Medications: MEDICATIONS  (STANDING):  lactated ringers. 500 milliLiter(s) (30 mL/Hr) IV Continuous <Continuous>  lidocaine 4% Injection for Nebulization 4 milliLiter(s) Nebulizer once    Physical Examination:  VITALS:   T(C): 36.2 (02-21-25 @ 08:05), Max: 36.2 (02-21-25 @ 08:05)  HR: 103 (02-21-25 @ 08:05) (103 - 103)  BP: 182/81 (02-21-25 @ 08:05) (182/81 - 182/81)  RR: 16 (02-21-25 @ 08:05) (16 - 16)  SpO2: 96% (02-21-25 @ 08:05) (96% - 96%)    GENERAL: NAD  EYES: EOMI  CHEST/LUNG: breathing on RA; Speaking in full sentences; normal WOB  HEART: Regular rate  ABDOMEN: BSx4; Soft, nontender, nondistended  EXTREMITIES:  No edema  NERVOUS SYSTEM:  A&Ox3    Comments:    ASA Class: I []  II [X]  III []  IV []    The patient is a suitable candidate for the planned procedure unless box checked [ ]  No, explain:

## 2025-02-21 NOTE — ASU PATIENT PROFILE, ADULT - FALL HARM RISK - HARM RISK INTERVENTIONS

## 2025-02-21 NOTE — ASU PREOP CHECKLIST - SPO2 (%)
Attending Supervising Physicians Attestation Statement  The patient met the criteria for indirect supervision. I discussed the findings and plans with the physician assistant and agree as documented in her note .     Electronically signed by Boby Avalos MD on 6/19/22 at 12:06 AM EDT          Boby Avalos MD  06/19/22 [id number]

## 2025-02-21 NOTE — ASU DISCHARGE PLAN (ADULT/PEDIATRIC) - FINANCIAL ASSISTANCE
[facility] provides services at a reduced cost to those who are determined to be eligible through [facility]’s financial assistance program. Information regarding [facility]’s financial assistance program can be found by going to [email or web address] or by calling [phone].

## 2025-02-21 NOTE — ASU PATIENT PROFILE, ADULT - NSICDXPASTMEDICALHX_GEN_ALL_CORE_FT
PAST MEDICAL HISTORY:  H/O carotid stenosis     H/O right bundle branch block     H/O sciatica     History of chronic back pain     HLD (hyperlipidemia)     Hypertension     Nasal polyps     Nasal septal deviation     T2DM (type 2 diabetes mellitus)

## 2025-02-22 PROCEDURE — 82962 GLUCOSE BLOOD TEST: CPT

## 2025-02-22 PROCEDURE — 45385 COLONOSCOPY W/LESION REMOVAL: CPT

## 2025-02-22 PROCEDURE — 94640 AIRWAY INHALATION TREATMENT: CPT

## 2025-02-22 PROCEDURE — 43239 EGD BIOPSY SINGLE/MULTIPLE: CPT

## 2025-02-22 PROCEDURE — 88305 TISSUE EXAM BY PATHOLOGIST: CPT

## 2025-02-24 LAB — SURGICAL PATHOLOGY STUDY: SIGNIFICANT CHANGE UP

## 2025-04-29 RX ORDER — FERROUS GLUCONATE 324(38)MG
324 (38 FE) TABLET ORAL
Qty: 30 | Refills: 2 | Status: ACTIVE | COMMUNITY
Start: 2025-04-29 | End: 1900-01-01

## 2025-05-06 ENCOUNTER — NON-APPOINTMENT (OUTPATIENT)
Age: 80
End: 2025-05-06

## 2025-05-07 ENCOUNTER — APPOINTMENT (OUTPATIENT)
Dept: GASTROENTEROLOGY | Facility: CLINIC | Age: 80
End: 2025-05-07

## 2025-05-07 VITALS
SYSTOLIC BLOOD PRESSURE: 119 MMHG | HEIGHT: 62 IN | BODY MASS INDEX: 37.91 KG/M2 | HEART RATE: 106 BPM | OXYGEN SATURATION: 91 % | WEIGHT: 206 LBS | DIASTOLIC BLOOD PRESSURE: 78 MMHG

## 2025-05-07 PROCEDURE — 91110 GI TRC IMG INTRAL ESOPH-ILE: CPT

## 2025-06-20 ENCOUNTER — NON-APPOINTMENT (OUTPATIENT)
Age: 80
End: 2025-06-20

## 2025-08-11 ENCOUNTER — RX RENEWAL (OUTPATIENT)
Age: 80
End: 2025-08-11

## (undated) DEVICE — BALLOON US ENDO

## (undated) DEVICE — SOL INJ NS 0.9% 500ML 2 PORT

## (undated) DEVICE — ELCTR GROUNDING PAD ADULT COVIDIEN

## (undated) DEVICE — CATH IV SAFE BC 20G X 1.16" (PINK)

## (undated) DEVICE — TUBING SUCTION 20FT

## (undated) DEVICE — SNARE LESIONHUNTER ROTAT NITNL COLD 15MM

## (undated) DEVICE — SYR LUER LOK 50CC

## (undated) DEVICE — POLY TRAP ETRAP

## (undated) DEVICE — SUCTION YANKAUER NO CONTROL VENT

## (undated) DEVICE — FOLEY HOLDER STATLOCK 2 WAY ADULT

## (undated) DEVICE — TUBING SUCTION CONN 6FT STERILE

## (undated) DEVICE — SENSOR O2 FINGER ADULT

## (undated) DEVICE — SYR ALLIANCE II INFLATION 60ML

## (undated) DEVICE — CATH IV SAFE BC 22G X 1" (BLUE)

## (undated) DEVICE — IRRIGATOR BIO SHIELD

## (undated) DEVICE — SNARE CAPTIVATOR RND COLD STIFF 2.4X10MM 240CM

## (undated) DEVICE — BITE BLOCK ADULT 20 X 27MM (GREEN)

## (undated) DEVICE — CLAMP BX HOT RAD JAW 3

## (undated) DEVICE — FORCEP RADIAL JAW 4 JUMBO 2.8MM 3.2MM 240CM ORANGE DISP

## (undated) DEVICE — TUBING IV SET GRAVITY 3Y 100" MACRO

## (undated) DEVICE — BRUSH COLONOSCOPY CYTOLOGY

## (undated) DEVICE — BIOPSY FORCEP RADIAL JAW 4 STANDARD WITH NEEDLE

## (undated) DEVICE — PACK IV START WITH CHG